# Patient Record
Sex: FEMALE | Race: WHITE | NOT HISPANIC OR LATINO | Employment: UNEMPLOYED | URBAN - METROPOLITAN AREA
[De-identification: names, ages, dates, MRNs, and addresses within clinical notes are randomized per-mention and may not be internally consistent; named-entity substitution may affect disease eponyms.]

---

## 2018-05-30 ENCOUNTER — OFFICE VISIT (OUTPATIENT)
Dept: OBSTETRICS AND GYNECOLOGY | Facility: CLINIC | Age: 28
End: 2018-05-30

## 2018-05-30 VITALS
SYSTOLIC BLOOD PRESSURE: 134 MMHG | BODY MASS INDEX: 31.34 KG/M2 | WEIGHT: 195 LBS | DIASTOLIC BLOOD PRESSURE: 80 MMHG | HEIGHT: 66 IN

## 2018-05-30 DIAGNOSIS — Z11.51 SPECIAL SCREENING EXAMINATION FOR HUMAN PAPILLOMAVIRUS (HPV): ICD-10-CM

## 2018-05-30 DIAGNOSIS — G43.829 MENSTRUAL MIGRAINE WITHOUT STATUS MIGRAINOSUS, NOT INTRACTABLE: ICD-10-CM

## 2018-05-30 DIAGNOSIS — Z01.419 ENCOUNTER FOR GYNECOLOGICAL EXAMINATION WITHOUT ABNORMAL FINDING: Primary | ICD-10-CM

## 2018-05-30 DIAGNOSIS — Z01.419 PAP SMEAR, LOW-RISK: ICD-10-CM

## 2018-05-30 LAB
B-HCG UR QL: NEGATIVE
BILIRUB BLD-MCNC: NEGATIVE MG/DL
CLARITY, POC: CLEAR
COLOR UR: YELLOW
GLUCOSE UR STRIP-MCNC: NEGATIVE MG/DL
INTERNAL NEGATIVE CONTROL: NEGATIVE
INTERNAL POSITIVE CONTROL: POSITIVE
KETONES UR QL: NEGATIVE
LEUKOCYTE EST, POC: NEGATIVE
Lab: NORMAL
NITRITE UR-MCNC: NEGATIVE MG/ML
PH UR: 5 [PH] (ref 5–8)
PROT UR STRIP-MCNC: NEGATIVE MG/DL
RBC # UR STRIP: NEGATIVE /UL
SP GR UR: 1 (ref 1–1.03)
UROBILINOGEN UR QL: NORMAL

## 2018-05-30 PROCEDURE — 99213 OFFICE O/P EST LOW 20 MIN: CPT | Performed by: OBSTETRICS & GYNECOLOGY

## 2018-05-30 PROCEDURE — 99385 PREV VISIT NEW AGE 18-39: CPT | Performed by: OBSTETRICS & GYNECOLOGY

## 2018-05-30 PROCEDURE — 81002 URINALYSIS NONAUTO W/O SCOPE: CPT | Performed by: OBSTETRICS & GYNECOLOGY

## 2018-05-30 PROCEDURE — 81025 URINE PREGNANCY TEST: CPT | Performed by: OBSTETRICS & GYNECOLOGY

## 2018-05-30 RX ORDER — SUMATRIPTAN 100 MG/1
TABLET, FILM COATED ORAL
COMMUNITY
Start: 2018-05-14 | End: 2018-06-12 | Stop reason: SDUPTHER

## 2018-05-30 RX ORDER — NORETHINDRONE ACETATE AND ETHINYL ESTRADIOL AND FERROUS FUMARATE 1MG-20(21)
KIT ORAL
COMMUNITY
Start: 2018-05-14 | End: 2018-08-09

## 2018-05-30 NOTE — PROGRESS NOTES
GYN Annual Exam     CC- Here for annual exam.     Aziza Sarkar is a 28 y.o. female who presents for annual well woman exam. Periods are regular every 28-30 days, lasting 5 days. Dysmenorrhea:none. Cyclic symptoms include none. No intermenstrual bleeding, spotting, or discharge.  Patient is new to the area.  She was on Lo Loestrin to control her menstrual migraines.  Her migraines occurred right before her periods.  She had good control on Lo Loestrin when it became unavailable on her insurance.  She was switched to Microgestin at that time.  Since that time her menstrual migraines have worsened.  She wants to go back on the other medications if possible.    OB History     No data available          Current contraception: OCP (estrogen/progesterone)  History of abnormal Pap smear: no  Family history of uterine, colon or ovarian cancer: no  History of abnormal mammogram: no  Family history of breast cancer: no  Last Pap : 2016    Past Medical History:   Diagnosis Date   • Migraine        Past Surgical History:   Procedure Laterality Date   •  SECTION     • WISDOM TOOTH EXTRACTION           Current Outpatient Prescriptions:   •  MICROGESTIN FE  1-20 MG-MCG per tablet, , Disp: , Rfl:   •  SUMAtriptan (IMITREX) 100 MG tablet, , Disp: , Rfl:     No Known Allergies    Social History   Substance Use Topics   • Smoking status: Never Smoker   • Smokeless tobacco: Not on file   • Alcohol use No       History reviewed. No pertinent family history.    Review of Systems   Constitutional: Negative for appetite change, fever and unexpected weight change.   HENT: Negative for congestion and sore throat.    Eyes: Positive for photophobia.   Respiratory: Negative for cough and shortness of breath.    Cardiovascular: Negative for chest pain and palpitations.   Gastrointestinal: Negative for abdominal distention, abdominal pain, constipation, diarrhea, nausea and vomiting.   Endocrine: Negative.    Genitourinary: Negative  "for dyspareunia, menstrual problem, pelvic pain and vaginal discharge.   Skin: Negative.    Neurological: Negative for dizziness, seizures and syncope.   Hematological: Negative.    Psychiatric/Behavioral: Negative for dysphoric mood and sleep disturbance. The patient is not nervous/anxious.        /80   Ht 167.6 cm (66\")   Wt 88.5 kg (195 lb)   LMP 05/24/2018   Breastfeeding? No   BMI 31.47 kg/m²     Physical Exam   Constitutional: She is oriented to person, place, and time. She appears well-developed and well-nourished.   HENT:   Head: Normocephalic and atraumatic.   Neck: Normal range of motion. Neck supple. No thyromegaly present.   Cardiovascular: Normal rate and regular rhythm.    Pulmonary/Chest: Effort normal and breath sounds normal. Right breast exhibits no mass and no nipple discharge. Left breast exhibits no mass and no nipple discharge. Breasts are symmetrical. There is no breast swelling.   Abdominal: Soft. Bowel sounds are normal. She exhibits no distension and no mass. There is no tenderness. There is no rebound and no guarding.   Genitourinary: Vagina normal and uterus normal. No breast tenderness, discharge or bleeding. Pelvic exam was performed with patient prone. There is no lesion on the right labia. There is no lesion on the left labia. Cervix exhibits no motion tenderness and no discharge. Right adnexum displays no mass. Left adnexum displays no mass.   Musculoskeletal: Normal range of motion. She exhibits no edema.   Neurological: She is alert and oriented to person, place, and time.   Skin: Skin is warm and dry.   Psychiatric: She has a normal mood and affect. Her behavior is normal. Judgment and thought content normal.   Nursing note and vitals reviewed.      Diagnoses and all orders for this visit:    Special screening examination for human papillomavirus (HPV)  -     POC Urinalysis Dipstick  -     POC Pregnancy, Urine  -     Pap IG, Rfx HPV ASCU    Pap smear, low-risk  -     " POC Urinalysis Dipstick  -     POC Pregnancy, Urine  -     Pap IG, Rfx HPV ASCU    Other orders  -     MICROGESTIN FE 1/20 1-20 MG-MCG per tablet;   -     SUMAtriptan (IMITREX) 100 MG tablet;         Assessment     1) GYN annual well woman exam.   2) menstrual migraine-patient will go ahead and stop Microgestin today.  3 samples of Lo Loestrin were given to the patient.  If in fact she feels better then we have evidence for approval for prior authorization through [employer].  3 samples were given.  Follow-up in 3 months.     Plan     1) Breast Health - Clinical breast exam & mammogram yearly, Self breast awareness monthly  2) Pap - done today  3) Smoking status- Never smoker  4) Colon health - screening colonoscopy recommended if not up to date  5) Bone health - Weight bearing exercise, dietary calcium recommendations and vitamin D reviewed.   6) Seat belts recommended  7) Follow up prn and one year    Encounter Diagnoses   Name Primary?   • Special screening examination for human papillomavirus (HPV) Yes   • Pap smear, low-risk          Emmett Travis MD  5/30/2018  9:17 AM

## 2018-06-05 LAB
CONV .: ABNORMAL
CYTOLOGIST CVX/VAG CYTO: ABNORMAL
CYTOLOGY CVX/VAG DOC THIN PREP: ABNORMAL
DX ICD CODE: ABNORMAL
DX ICD CODE: ABNORMAL
HIV 1 & 2 AB SER-IMP: ABNORMAL
HPV I/H RISK 1 DNA CVX QL PROBE+SIG AMP: NORMAL
HPV I/H RISK 4 DNA CVX QL PROBE+SIG AMP: NEGATIVE
OTHER STN SPEC: ABNORMAL
PATH REPORT.FINAL DX SPEC: ABNORMAL
PATHOLOGIST CVX/VAG CYTO: ABNORMAL
STAT OF ADQ CVX/VAG CYTO-IMP: ABNORMAL

## 2018-06-06 ENCOUNTER — TELEPHONE (OUTPATIENT)
Dept: OBSTETRICS AND GYNECOLOGY | Facility: CLINIC | Age: 28
End: 2018-06-06

## 2018-06-06 NOTE — TELEPHONE ENCOUNTER
Would you be willing to refill pts sumatriptin? She says she asked you about it when she saw you back on 05/30/18.

## 2018-06-12 RX ORDER — SUMATRIPTAN 100 MG/1
TABLET, FILM COATED ORAL
Qty: 20 TABLET | Refills: 2 | Status: SHIPPED | OUTPATIENT
Start: 2018-06-12 | End: 2019-11-18 | Stop reason: SDUPTHER

## 2018-08-09 ENCOUNTER — OFFICE VISIT (OUTPATIENT)
Dept: OBSTETRICS AND GYNECOLOGY | Facility: CLINIC | Age: 28
End: 2018-08-09

## 2018-08-09 VITALS
SYSTOLIC BLOOD PRESSURE: 122 MMHG | WEIGHT: 200 LBS | HEIGHT: 66 IN | BODY MASS INDEX: 32.14 KG/M2 | DIASTOLIC BLOOD PRESSURE: 80 MMHG

## 2018-08-09 DIAGNOSIS — Z13.9 SCREENING FOR CONDITION: ICD-10-CM

## 2018-08-09 DIAGNOSIS — G43.829 MENSTRUAL MIGRAINE WITHOUT STATUS MIGRAINOSUS, NOT INTRACTABLE: Primary | ICD-10-CM

## 2018-08-09 PROCEDURE — 81002 URINALYSIS NONAUTO W/O SCOPE: CPT | Performed by: OBSTETRICS & GYNECOLOGY

## 2018-08-09 PROCEDURE — 99213 OFFICE O/P EST LOW 20 MIN: CPT | Performed by: OBSTETRICS & GYNECOLOGY

## 2018-08-09 PROCEDURE — 81025 URINE PREGNANCY TEST: CPT | Performed by: OBSTETRICS & GYNECOLOGY

## 2018-08-09 NOTE — PROGRESS NOTES
"      Aziza Sarkar is a 28 y.o. patient who presents for follow up of   Chief Complaint   Patient presents with   • Follow-up     migraines       HPI 28-year-old female who is here to follow-up for menstrual migraines.  She had done well on Lo Loestrin in the past but her insurance stopped covering it.  She was switched to Microgestin migraines returned.  3 months of samples were given to the patient.  She reports very minimal periods on the 3 months of samples that she had and she had no menstrual migraines.  She still did have some migraines but they were not associated with her periods.  She is happy to be back on Lo Loestrin because it works better for her.    The following portions of the patient's history were reviewed and updated as appropriate: allergies, current medications and problem list.    Review of Systems   Constitutional: Negative for appetite change, fever and unexpected weight change.   Respiratory: Negative for cough and shortness of breath.    Cardiovascular: Negative for chest pain and palpitations.   Gastrointestinal: Negative for abdominal distention, abdominal pain, constipation, diarrhea, nausea and vomiting.   Endocrine: Negative.    Genitourinary: Negative for dyspareunia, menstrual problem, pelvic pain and vaginal discharge.   Skin: Negative.    Hematological: Negative.    Psychiatric/Behavioral: Negative for dysphoric mood and sleep disturbance. The patient is not nervous/anxious.        /80   Ht 167.6 cm (66\")   Wt 90.7 kg (200 lb)   BMI 32.28 kg/m²     Physical Exam   Constitutional: She is oriented to person, place, and time. She appears well-developed and well-nourished.   HENT:   Head: Normocephalic and atraumatic.   Pulmonary/Chest: Effort normal.   Abdominal: Soft. She exhibits no distension and no mass. There is no tenderness. There is no rebound and no guarding.   Musculoskeletal: Normal range of motion.   Neurological: She is alert and oriented to person, place, and " time.   Skin: Skin is warm and dry.   Psychiatric: She has a normal mood and affect. Her behavior is normal. Judgment and thought content normal.   Nursing note and vitals reviewed.        Assessment/Plan    Aziza was seen today for follow-up.    Diagnoses and all orders for this visit:    Menstrual migraine without status migrainosus, not intractable    Screening for condition  -     POC Urinalysis Dipstick  -     POC Pregnancy, Urine    Other orders  -     Norethin-Eth Estrad-Fe Biphas (LO LOESTRIN FE) 1 MG-10 MCG / 10 MCG tablet; Take 1 tablet by mouth Daily.    2 more samples were given.  Need to pre-certify Loestrin for patient.    Return in about 1 year (around 8/9/2019) for Annual physical.      Emmett Travis MD  8/9/2018  9:18 AM

## 2018-08-23 ENCOUNTER — TELEPHONE (OUTPATIENT)
Dept: OBSTETRICS AND GYNECOLOGY | Facility: CLINIC | Age: 28
End: 2018-08-23

## 2018-08-23 NOTE — TELEPHONE ENCOUNTER
Per Herber at [facility], patient has no active drug Rx coverage. I called the patient and left this information on her voice mail

## 2019-06-13 ENCOUNTER — OFFICE VISIT (OUTPATIENT)
Dept: OBSTETRICS AND GYNECOLOGY | Facility: CLINIC | Age: 29
End: 2019-06-13

## 2019-06-13 VITALS
BODY MASS INDEX: 30.63 KG/M2 | SYSTOLIC BLOOD PRESSURE: 122 MMHG | HEIGHT: 66 IN | WEIGHT: 190.6 LBS | DIASTOLIC BLOOD PRESSURE: 80 MMHG

## 2019-06-13 DIAGNOSIS — Z01.419 WELL WOMAN EXAM WITH ROUTINE GYNECOLOGICAL EXAM: Primary | ICD-10-CM

## 2019-06-13 DIAGNOSIS — N91.2 AMENORRHEA: ICD-10-CM

## 2019-06-13 PROCEDURE — 81002 URINALYSIS NONAUTO W/O SCOPE: CPT | Performed by: OBSTETRICS & GYNECOLOGY

## 2019-06-13 PROCEDURE — 81025 URINE PREGNANCY TEST: CPT | Performed by: OBSTETRICS & GYNECOLOGY

## 2019-06-13 PROCEDURE — 99395 PREV VISIT EST AGE 18-39: CPT | Performed by: OBSTETRICS & GYNECOLOGY

## 2019-06-13 RX ORDER — SUMATRIPTAN 100 MG/1
TABLET, FILM COATED ORAL
COMMUNITY
Start: 2019-01-16 | End: 2019-06-13 | Stop reason: SDUPTHER

## 2019-06-13 NOTE — PROGRESS NOTES
GYN Annual Exam     CC- Here for annual exam.     Aziza Sarkar is a 29 y.o. female who presents for annual well woman exam. Periods are absent.  Patient had been using low Loestrin to help with migraines and for her cycling.  Patient has secondary amenorrhea.  She only conceived with her twins after 9 months of Clomid therapy.  She was subsequently placed back on low Loestrin to help her cycle.  She ran out in January of this year and has had no bleeding since.    OB History      Para Term  AB Living    1 1   1   2    SAB TAB Ectopic Molar Multiple Live Births            1 2          Current contraception: none  History of abnormal Pap smear: yes - ASCUS, neg HPV  Family history of uterine, colon or ovarian cancer: no  History of abnormal mammogram: no  Family history of breast cancer: no  Last Pap : 2018    Past Medical History:   Diagnosis Date   • Migraine        Past Surgical History:   Procedure Laterality Date   •  SECTION     • WISDOM TOOTH EXTRACTION           Current Outpatient Medications:   •  SUMAtriptan (IMITREX) 100 MG tablet, Take 1 po, many repeat in 2 hrs x 1, Disp: 20 tablet, Rfl: 2    No Known Allergies    Social History     Tobacco Use   • Smoking status: Never Smoker   • Smokeless tobacco: Never Used   Substance Use Topics   • Alcohol use: No   • Drug use: No       Family History   Problem Relation Age of Onset   • No Known Problems Father    • Ulcerative colitis Mother    • No Known Problems Sister    • No Known Problems Daughter    • Heart disease Paternal Grandfather    • Heart disease Paternal Grandmother    • Heart disease Maternal Grandmother    • Heart disease Maternal Grandfather    • No Known Problems Daughter        Review of Systems   Constitutional: Negative for appetite change, fever and unexpected weight change.   HENT: Negative for congestion and sore throat.    Respiratory: Negative for cough and shortness of breath.    Cardiovascular: Negative for chest  "pain and palpitations.   Gastrointestinal: Negative for abdominal distention, abdominal pain, constipation, diarrhea, nausea and vomiting.   Endocrine: Negative.    Genitourinary: Negative for dyspareunia, menstrual problem, pelvic pain and vaginal discharge.   Skin: Negative.    Neurological: Negative for dizziness and syncope.   Hematological: Negative.    Psychiatric/Behavioral: Negative for dysphoric mood and sleep disturbance. The patient is not nervous/anxious.        /80   Ht 167.6 cm (66\")   Wt 86.5 kg (190 lb 9.6 oz)   LMP 02/01/2019 (Approximate) Comment: very small  BMI 30.76 kg/m²     Physical Exam   Constitutional: She is oriented to person, place, and time. She appears well-developed and well-nourished.   HENT:   Head: Normocephalic and atraumatic.   Neck: Normal range of motion. Neck supple. No thyromegaly present.   Cardiovascular: Normal rate and regular rhythm.   Pulmonary/Chest: Effort normal and breath sounds normal. Right breast exhibits no mass and no nipple discharge. Left breast exhibits no mass and no nipple discharge. Breasts are symmetrical. There is no breast swelling.   Abdominal: Soft. Bowel sounds are normal. She exhibits no distension and no mass. There is no tenderness. There is no rebound and no guarding.   Genitourinary: Vagina normal and uterus normal. No breast tenderness, discharge or bleeding. Pelvic exam was performed with patient prone. There is no lesion on the right labia. There is no lesion on the left labia. Cervix exhibits no motion tenderness and no discharge. Right adnexum displays no mass. Left adnexum displays no mass.   Musculoskeletal: Normal range of motion. She exhibits no edema.   Neurological: She is alert and oriented to person, place, and time.   Skin: Skin is warm and dry.   Psychiatric: She has a normal mood and affect. Her behavior is normal. Judgment and thought content normal.   Nursing note and vitals reviewed.      Diagnoses and all orders " for this visit:    Well woman exam with routine gynecological exam  -     POC Urinalysis Dipstick  -     POC Pregnancy, Urine  -     Pap IG, HPV-hr    Other orders  -     Discontinue: SUMAtriptan (IMITREX) 100 MG tablet        Assessment     1) GYN annual well woman exam.   2) secondary amenorrhea-referral to reproductive endocrine for evaluation.  Patient will need to cycle at some point.  Need etiology.     Plan     1) Breast Health - Clinical breast exam & mammogram yearly, Self breast awareness monthly  2) Pap - done today  3) Smoking status- Never smoker  4) Colon health - screening colonoscopy recommended if not up to date  5) Bone health - Weight bearing exercise, dietary calcium recommendations and vitamin D reviewed.   6) Seat belts recommended  7) Follow up prn and one year    Encounter Diagnoses   Name Primary?   • Well woman exam with routine gynecological exam Yes         Emmett Travis MD  6/13/2019  4:01 PM

## 2019-06-18 LAB
CYTOLOGIST CVX/VAG CYTO: NORMAL
CYTOLOGY CVX/VAG DOC CYTO: NORMAL
CYTOLOGY CVX/VAG DOC THIN PREP: NORMAL
DX ICD CODE: NORMAL
HIV 1 & 2 AB SER-IMP: NORMAL
HPV I/H RISK 1 DNA CVX QL PROBE+SIG AMP: NEGATIVE
OTHER STN SPEC: NORMAL
STAT OF ADQ CVX/VAG CYTO-IMP: NORMAL

## 2019-08-20 ENCOUNTER — OFFICE VISIT (OUTPATIENT)
Dept: OBSTETRICS AND GYNECOLOGY | Facility: CLINIC | Age: 29
End: 2019-08-20

## 2019-08-20 ENCOUNTER — PROCEDURE VISIT (OUTPATIENT)
Dept: OBSTETRICS AND GYNECOLOGY | Facility: CLINIC | Age: 29
End: 2019-08-20

## 2019-08-20 VITALS
BODY MASS INDEX: 31.31 KG/M2 | SYSTOLIC BLOOD PRESSURE: 142 MMHG | WEIGHT: 194.8 LBS | DIASTOLIC BLOOD PRESSURE: 104 MMHG | HEIGHT: 66 IN

## 2019-08-20 DIAGNOSIS — Z32.00 POSSIBLE PREGNANCY, NOT CONFIRMED: Primary | ICD-10-CM

## 2019-08-20 DIAGNOSIS — O36.80X0 ENCOUNTER TO DETERMINE FETAL VIABILITY OF PREGNANCY, SINGLE OR UNSPECIFIED FETUS: Primary | ICD-10-CM

## 2019-08-20 DIAGNOSIS — Z98.891 HISTORY OF CESAREAN DELIVERY: ICD-10-CM

## 2019-08-20 LAB
B-HCG UR QL: POSITIVE
BILIRUB BLD-MCNC: NEGATIVE MG/DL
CLARITY, POC: CLEAR
COLOR UR: YELLOW
GLUCOSE UR STRIP-MCNC: NEGATIVE MG/DL
INTERNAL NEGATIVE CONTROL: POSITIVE
INTERNAL POSITIVE CONTROL: POSITIVE
KETONES UR QL: NEGATIVE
LEUKOCYTE EST, POC: NEGATIVE
Lab: ABNORMAL
NITRITE UR-MCNC: NEGATIVE MG/ML
PH UR: 5.5 [PH] (ref 5–8)
PROT UR STRIP-MCNC: NEGATIVE MG/DL
RBC # UR STRIP: NEGATIVE /UL
SP GR UR: 1.01 (ref 1–1.03)
UROBILINOGEN UR QL: NORMAL

## 2019-08-20 PROCEDURE — 81002 URINALYSIS NONAUTO W/O SCOPE: CPT | Performed by: OBSTETRICS & GYNECOLOGY

## 2019-08-20 PROCEDURE — 81025 URINE PREGNANCY TEST: CPT | Performed by: OBSTETRICS & GYNECOLOGY

## 2019-08-20 PROCEDURE — 76817 TRANSVAGINAL US OBSTETRIC: CPT | Performed by: OBSTETRICS & GYNECOLOGY

## 2019-08-20 PROCEDURE — 99213 OFFICE O/P EST LOW 20 MIN: CPT | Performed by: OBSTETRICS & GYNECOLOGY

## 2019-08-20 NOTE — PROGRESS NOTES
"PROBLEM VISIT    Chief Complaint: missed menses      Aziza Sarkar is a 29 y.o. patient who presents with missed menses and +UPT. Pt reports that she stopped OCPs at the end of 2018. She has had no bleeding since she stopped. She required Clomid for her last pregnancy that resulted in twins. Pt was seen for her annual in 2019 and was not pregnant. Pt took a pregnancy test bc her nipple were sensitive.  Chief Complaint   Patient presents with   • Possible Pregnancy             The following portions of the patient's history were reviewed and updated as appropriate: allergies, current medications and problem list.    Review of Systems   Constitutional: Positive for fatigue.   Gastrointestinal: Negative for nausea and vomiting.   Genitourinary: Positive for menstrual problem. Negative for pelvic pain, vaginal bleeding, vaginal discharge and vaginal pain.       BP (!) 142/104   Ht 167.6 cm (66\")   Wt 88.4 kg (194 lb 12.8 oz)   LMP  (LMP Unknown)   BMI 31.44 kg/m²     Physical Exam   Constitutional: She is oriented to person, place, and time. She appears well-developed and well-nourished. No distress.   Abdominal: Soft. She exhibits no distension. There is no tenderness. There is no rigidity and no guarding.   Neurological: She is alert and oriented to person, place, and time. No cranial nerve deficit. Coordination normal.   Skin: She is not diaphoretic.   Psychiatric: She has a normal mood and affect. Her behavior is normal. Judgment and thought content normal.   Vitals reviewed.        Assessment/Plan   Aziza was seen today for possible pregnancy.    Diagnoses and all orders for this visit:    Possible pregnancy, not confirmed  -     POC Urinalysis Dipstick  -     POC Pregnancy, Urine  -     HCG, B-subunit, Quantitative    History of  delivery    30yo with missed menses and unknown LMP    1) missed menses: She has not had a menstrual cycle since she stopped birth control in 2018.  " Positive urine pregnancy test.  Transvaginal ultrasound performed today reveals a gestational sac and yolk sac.  No IUP.  No adnexal masses.  Check beta-hCG quant level.  Follow-up in 2 weeks for another ultrasound and new OB visit. On vitamins    2) Hx of infertility: Had twins from Clomid induction. This is a spontaneous pregnancy.    3) Hx of prior C section               Return in about 2 weeks (around 9/3/2019).      Lore Koo DO    8/20/2019  3:25 PM

## 2019-08-21 LAB — HCG INTACT+B SERPL-ACNC: NORMAL MIU/ML

## 2019-08-30 ENCOUNTER — TELEPHONE (OUTPATIENT)
Dept: OBSTETRICS AND GYNECOLOGY | Facility: CLINIC | Age: 29
End: 2019-08-30

## 2019-09-04 RX ORDER — DIPHENHYDRAMINE HYDROCHLORIDE 25 MG/1
25 CAPSULE ORAL NIGHTLY
Qty: 30 TABLET | Refills: 0 | Status: SHIPPED | OUTPATIENT
Start: 2019-09-04 | End: 2019-10-07 | Stop reason: HOSPADM

## 2019-09-05 ENCOUNTER — INITIAL PRENATAL (OUTPATIENT)
Dept: OBSTETRICS AND GYNECOLOGY | Facility: CLINIC | Age: 29
End: 2019-09-05

## 2019-09-05 ENCOUNTER — PROCEDURE VISIT (OUTPATIENT)
Dept: OBSTETRICS AND GYNECOLOGY | Facility: CLINIC | Age: 29
End: 2019-09-05

## 2019-09-05 VITALS — SYSTOLIC BLOOD PRESSURE: 112 MMHG | BODY MASS INDEX: 31.64 KG/M2 | WEIGHT: 196 LBS | DIASTOLIC BLOOD PRESSURE: 82 MMHG

## 2019-09-05 DIAGNOSIS — Z36.9 ENCOUNTER FOR ANTENATAL SCREENING, UNSPECIFIED: ICD-10-CM

## 2019-09-05 DIAGNOSIS — Z34.91 NORMAL PREGNANCY IN FIRST TRIMESTER: Primary | ICD-10-CM

## 2019-09-05 DIAGNOSIS — O21.9 NAUSEA AND VOMITING DURING PREGNANCY: ICD-10-CM

## 2019-09-05 DIAGNOSIS — O36.80X0 ENCOUNTER TO DETERMINE FETAL VIABILITY OF PREGNANCY, SINGLE OR UNSPECIFIED FETUS: Primary | ICD-10-CM

## 2019-09-05 PROCEDURE — 0501F PRENATAL FLOW SHEET: CPT | Performed by: OBSTETRICS & GYNECOLOGY

## 2019-09-05 PROCEDURE — 76801 OB US < 14 WKS SINGLE FETUS: CPT | Performed by: OBSTETRICS & GYNECOLOGY

## 2019-09-05 NOTE — PROGRESS NOTES
OB follow up     Aziza Sarkar is a 29 y.o.  7w6d being seen today for her obstetrical visit.  Patient reports no bleeding and no leaking. Fetal movement: absent, patient reports significant nausea and vomiting but good relief with bonejesta. Needs refill.     Review of Systems  No bleeding, No cramping/contractions     /82   Wt 88.9 kg (196 lb)   LMP  (LMP Unknown)   BMI 31.64 kg/m²     FHT:   BPM   Uterine Size:     Ultrasound shows 7-week 6-day cade gestation with cardiac activity.  Both ovaries are normal adnexa is clear.    Assessment/Plan:    1) 29 y.o.  -pregnancy at 7w6d    2)   Encounter Diagnoses   Name Primary?   • Normal pregnancy in first trimester Yes   • Encounter for  screening, unspecified    • Nausea and vomiting during pregnancy    More samples of medications given.  EDC set up.  Draw prenatal labs today.    3) Reviewed this stage of pregnancy  4) Problem list updated     Return in about 4 weeks (around 10/3/2019).      Emmett Travis MD    2019  4:20 PM

## 2019-09-06 LAB
ABO GROUP BLD: (no result)
BASOPHILS # BLD AUTO: 0 X10E3/UL (ref 0–0.2)
BASOPHILS NFR BLD AUTO: 0 %
BLD GP AB SCN SERPL QL: NEGATIVE
EOSINOPHIL # BLD AUTO: 0.1 X10E3/UL (ref 0–0.4)
EOSINOPHIL NFR BLD AUTO: 1 %
ERYTHROCYTE [DISTWIDTH] IN BLOOD BY AUTOMATED COUNT: 12.6 % (ref 12.3–15.4)
HBA1C MFR BLD: 5.1 % (ref 4.8–5.6)
HBV SURFACE AG SERPL QL IA: NEGATIVE
HCT VFR BLD AUTO: 36.7 % (ref 34–46.6)
HCV AB S/CO SERPL IA: <0.1 S/CO RATIO (ref 0–0.9)
HGB BLD-MCNC: 12.6 G/DL (ref 11.1–15.9)
HIV 1+2 AB+HIV1 P24 AG SERPL QL IA: NON REACTIVE
IMM GRANULOCYTES # BLD AUTO: 0 X10E3/UL (ref 0–0.1)
IMM GRANULOCYTES NFR BLD AUTO: 0 %
LYMPHOCYTES # BLD AUTO: 3 X10E3/UL (ref 0.7–3.1)
LYMPHOCYTES NFR BLD AUTO: 33 %
MCH RBC QN AUTO: 31.3 PG (ref 26.6–33)
MCHC RBC AUTO-ENTMCNC: 34.3 G/DL (ref 31.5–35.7)
MCV RBC AUTO: 91 FL (ref 79–97)
MONOCYTES # BLD AUTO: 0.7 X10E3/UL (ref 0.1–0.9)
MONOCYTES NFR BLD AUTO: 8 %
NEUTROPHILS # BLD AUTO: 5.3 X10E3/UL (ref 1.4–7)
NEUTROPHILS NFR BLD AUTO: 58 %
PLATELET # BLD AUTO: 339 X10E3/UL (ref 150–450)
RBC # BLD AUTO: 4.03 X10E6/UL (ref 3.77–5.28)
RH BLD: POSITIVE
RPR SER QL: NON REACTIVE
RUBV IGG SERPL IA-ACNC: 7.02 INDEX
WBC # BLD AUTO: 9 X10E3/UL (ref 3.4–10.8)

## 2019-09-10 ENCOUNTER — TELEPHONE (OUTPATIENT)
Dept: OBSTETRICS AND GYNECOLOGY | Facility: CLINIC | Age: 29
End: 2019-09-10

## 2019-09-11 LAB
BACTERIA UR CULT: ABNORMAL
BACTERIA UR CULT: ABNORMAL
C TRACH RRNA VAG QL NAA+PROBE: NEGATIVE
DRUGS UR: NORMAL
N GONORRHOEA RRNA VAG QL NAA+PROBE: NEGATIVE
OTHER ANTIBIOTIC SUSC ISLT: ABNORMAL
T VAGINALIS RRNA VAG QL NAA+PROBE: NEGATIVE

## 2019-09-12 RX ORDER — NITROFURANTOIN 25; 75 MG/1; MG/1
100 CAPSULE ORAL 2 TIMES DAILY
Qty: 14 CAPSULE | Refills: 0 | Status: SHIPPED | OUTPATIENT
Start: 2019-09-12 | End: 2019-09-19

## 2019-10-04 ENCOUNTER — PROCEDURE VISIT (OUTPATIENT)
Dept: OBSTETRICS AND GYNECOLOGY | Facility: CLINIC | Age: 29
End: 2019-10-04

## 2019-10-04 ENCOUNTER — PREP FOR SURGERY (OUTPATIENT)
Dept: OTHER | Facility: HOSPITAL | Age: 29
End: 2019-10-04

## 2019-10-04 ENCOUNTER — ROUTINE PRENATAL (OUTPATIENT)
Dept: OBSTETRICS AND GYNECOLOGY | Facility: CLINIC | Age: 29
End: 2019-10-04

## 2019-10-04 DIAGNOSIS — O20.0 THREATENED ABORTION: Primary | ICD-10-CM

## 2019-10-04 DIAGNOSIS — O03.9 SAB (SPONTANEOUS ABORTION): Primary | ICD-10-CM

## 2019-10-04 PROCEDURE — 99214 OFFICE O/P EST MOD 30 MIN: CPT | Performed by: OBSTETRICS & GYNECOLOGY

## 2019-10-04 PROCEDURE — 76817 TRANSVAGINAL US OBSTETRIC: CPT | Performed by: OBSTETRICS & GYNECOLOGY

## 2019-10-04 RX ORDER — SODIUM CHLORIDE 0.9 % (FLUSH) 0.9 %
3 SYRINGE (ML) INJECTION EVERY 12 HOURS SCHEDULED
Status: CANCELLED | OUTPATIENT
Start: 2019-10-04

## 2019-10-04 RX ORDER — SODIUM CHLORIDE 9 MG/ML
40 INJECTION, SOLUTION INTRAVENOUS AS NEEDED
Status: CANCELLED | OUTPATIENT
Start: 2019-10-04

## 2019-10-04 RX ORDER — SODIUM CHLORIDE 0.9 % (FLUSH) 0.9 %
1-10 SYRINGE (ML) INJECTION AS NEEDED
Status: CANCELLED | OUTPATIENT
Start: 2019-10-04

## 2019-10-04 NOTE — PROGRESS NOTES
PROBLEM VISIT    Chief Complaint: VB      Aziza Sarkar is a 29 y.o. patient  with hx of 33 week twin gestation presents to office today at 12 weeks with VB. VB started last night. She had some spotting a couple of weeks ago. Bleeding was more last night so she presented for US and no FCA activity seen on US today.  Chief Complaint   Patient presents with   • Pregnancy Problem             The following portions of the patient's history were reviewed and updated as appropriate: allergies, current medications and problem list.    Review of Systems  +VB. +cramping. No F/C/  No Nausea/vomiting    Physical Exam    Constitutional: She is oriented to person, place, and time. She appears well-developed and well-nourished.   Neurological: She is alert and oriented to person, place, and time.   Psychiatric: She has a normal mood and affect. She is teary eyed during interview. Her behavior is normal. Judgment and thought content normal.   Vitals reviewed.    Assessment/Plan   Aziza was seen today for pregnancy problem.    Diagnoses and all orders for this visit:    SAB (spontaneous )    29-year-old -1-0-2 with SAB    Transvaginal ultrasound reveals IUP at 7 weeks 5 days with no fetal cardiac activity.  Rh+  Patient was given options for management of SAB including doing nothing and waiting to see if her body would pass the contents of the uterus on its own, Cytotec induction or suction D&C.  Patient has decided to proceed with a suction D&C.  We will proceed with scheduling surgery.  The procedure was reviewed at length with the patient including pre-/intra-/post procedure.  Patient was given precautions to call the office with heavy vaginal bleeding.               No Follow-up on file.      Lore Koo DO    10/8/2019  12:45 AM

## 2019-10-07 ENCOUNTER — HOSPITAL ENCOUNTER (OUTPATIENT)
Facility: HOSPITAL | Age: 29
Setting detail: HOSPITAL OUTPATIENT SURGERY
Discharge: HOME | End: 2019-10-07
Attending: OBSTETRICS & GYNECOLOGY | Admitting: OBSTETRICS & GYNECOLOGY

## 2019-10-07 ENCOUNTER — ANESTHESIA EVENT (OUTPATIENT)
Dept: PERIOP | Facility: HOSPITAL | Age: 29
End: 2019-10-07

## 2019-10-07 ENCOUNTER — ANESTHESIA (OUTPATIENT)
Dept: PERIOP | Facility: HOSPITAL | Age: 29
End: 2019-10-07

## 2019-10-07 VITALS
WEIGHT: 196.6 LBS | HEART RATE: 68 BPM | DIASTOLIC BLOOD PRESSURE: 92 MMHG | OXYGEN SATURATION: 99 % | HEIGHT: 66 IN | TEMPERATURE: 97.2 F | BODY MASS INDEX: 31.6 KG/M2 | RESPIRATION RATE: 16 BRPM | SYSTOLIC BLOOD PRESSURE: 124 MMHG

## 2019-10-07 DIAGNOSIS — O03.9 SAB (SPONTANEOUS ABORTION): ICD-10-CM

## 2019-10-07 PROCEDURE — 25010000002 KETOROLAC TROMETHAMINE PER 15 MG: Performed by: NURSE ANESTHETIST, CERTIFIED REGISTERED

## 2019-10-07 PROCEDURE — 25010000002 FENTANYL CITRATE (PF) 100 MCG/2ML SOLUTION: Performed by: NURSE ANESTHETIST, CERTIFIED REGISTERED

## 2019-10-07 PROCEDURE — 25010000002 ONDANSETRON PER 1 MG: Performed by: NURSE ANESTHETIST, CERTIFIED REGISTERED

## 2019-10-07 PROCEDURE — 59812 TREATMENT OF MISCARRIAGE: CPT | Performed by: OBSTETRICS & GYNECOLOGY

## 2019-10-07 PROCEDURE — 25010000002 PROPOFOL 10 MG/ML EMULSION: Performed by: NURSE ANESTHETIST, CERTIFIED REGISTERED

## 2019-10-07 PROCEDURE — 25010000002 MIDAZOLAM PER 1 MG: Performed by: NURSE ANESTHETIST, CERTIFIED REGISTERED

## 2019-10-07 PROCEDURE — 25010000002 DEXAMETHASONE PER 1 MG: Performed by: NURSE ANESTHETIST, CERTIFIED REGISTERED

## 2019-10-07 PROCEDURE — 88305 TISSUE EXAM BY PATHOLOGIST: CPT | Performed by: OBSTETRICS & GYNECOLOGY

## 2019-10-07 PROCEDURE — S0260 H&P FOR SURGERY: HCPCS | Performed by: OBSTETRICS & GYNECOLOGY

## 2019-10-07 RX ORDER — SODIUM CHLORIDE 0.9 % (FLUSH) 0.9 %
1-10 SYRINGE (ML) INJECTION AS NEEDED
Status: DISCONTINUED | OUTPATIENT
Start: 2019-10-07 | End: 2019-10-07 | Stop reason: HOSPADM

## 2019-10-07 RX ORDER — SODIUM CHLORIDE 9 MG/ML
40 INJECTION, SOLUTION INTRAVENOUS AS NEEDED
Status: DISCONTINUED | OUTPATIENT
Start: 2019-10-07 | End: 2019-10-07 | Stop reason: HOSPADM

## 2019-10-07 RX ORDER — MIDAZOLAM HYDROCHLORIDE 1 MG/ML
1 INJECTION INTRAMUSCULAR; INTRAVENOUS
Status: DISCONTINUED | OUTPATIENT
Start: 2019-10-07 | End: 2019-10-07 | Stop reason: HOSPADM

## 2019-10-07 RX ORDER — PROPOFOL 10 MG/ML
VIAL (ML) INTRAVENOUS AS NEEDED
Status: DISCONTINUED | OUTPATIENT
Start: 2019-10-07 | End: 2019-10-07 | Stop reason: SURG

## 2019-10-07 RX ORDER — LIDOCAINE HYDROCHLORIDE 10 MG/ML
0.5 INJECTION, SOLUTION EPIDURAL; INFILTRATION; INTRACAUDAL; PERINEURAL ONCE AS NEEDED
Status: DISCONTINUED | OUTPATIENT
Start: 2019-10-07 | End: 2019-10-07 | Stop reason: HOSPADM

## 2019-10-07 RX ORDER — NAPROXEN 500 MG/1
500 TABLET ORAL 2 TIMES DAILY WITH MEALS
Qty: 20 TABLET | Refills: 0 | OUTPATIENT
Start: 2019-10-07 | End: 2021-08-16

## 2019-10-07 RX ORDER — MIDAZOLAM HYDROCHLORIDE 1 MG/ML
2 INJECTION INTRAMUSCULAR; INTRAVENOUS
Status: DISCONTINUED | OUTPATIENT
Start: 2019-10-07 | End: 2019-10-07 | Stop reason: HOSPADM

## 2019-10-07 RX ORDER — KETOROLAC TROMETHAMINE 30 MG/ML
INJECTION, SOLUTION INTRAMUSCULAR; INTRAVENOUS AS NEEDED
Status: DISCONTINUED | OUTPATIENT
Start: 2019-10-07 | End: 2019-10-07 | Stop reason: SURG

## 2019-10-07 RX ORDER — HYDROMORPHONE HYDROCHLORIDE 1 MG/ML
0.5 INJECTION, SOLUTION INTRAMUSCULAR; INTRAVENOUS; SUBCUTANEOUS AS NEEDED
Status: DISCONTINUED | OUTPATIENT
Start: 2019-10-07 | End: 2019-10-07 | Stop reason: HOSPADM

## 2019-10-07 RX ORDER — OXYCODONE HYDROCHLORIDE AND ACETAMINOPHEN 5; 325 MG/1; MG/1
1 TABLET ORAL ONCE AS NEEDED
Status: DISCONTINUED | OUTPATIENT
Start: 2019-10-07 | End: 2019-10-07 | Stop reason: HOSPADM

## 2019-10-07 RX ORDER — DEXAMETHASONE SODIUM PHOSPHATE 4 MG/ML
8 INJECTION, SOLUTION INTRA-ARTICULAR; INTRALESIONAL; INTRAMUSCULAR; INTRAVENOUS; SOFT TISSUE ONCE AS NEEDED
Status: COMPLETED | OUTPATIENT
Start: 2019-10-07 | End: 2019-10-07

## 2019-10-07 RX ORDER — OXYCODONE AND ACETAMINOPHEN 7.5; 325 MG/1; MG/1
2 TABLET ORAL EVERY 4 HOURS PRN
Status: DISCONTINUED | OUTPATIENT
Start: 2019-10-07 | End: 2019-10-07 | Stop reason: HOSPADM

## 2019-10-07 RX ORDER — FENTANYL CITRATE 50 UG/ML
INJECTION, SOLUTION INTRAMUSCULAR; INTRAVENOUS AS NEEDED
Status: DISCONTINUED | OUTPATIENT
Start: 2019-10-07 | End: 2019-10-07 | Stop reason: SURG

## 2019-10-07 RX ORDER — SODIUM CHLORIDE 0.9 % (FLUSH) 0.9 %
3 SYRINGE (ML) INJECTION EVERY 12 HOURS SCHEDULED
Status: DISCONTINUED | OUTPATIENT
Start: 2019-10-07 | End: 2019-10-07 | Stop reason: HOSPADM

## 2019-10-07 RX ORDER — KETAMINE HYDROCHLORIDE 100 MG/ML
INJECTION INTRAMUSCULAR; INTRAVENOUS AS NEEDED
Status: DISCONTINUED | OUTPATIENT
Start: 2019-10-07 | End: 2019-10-07 | Stop reason: SURG

## 2019-10-07 RX ORDER — SODIUM CHLORIDE, SODIUM LACTATE, POTASSIUM CHLORIDE, CALCIUM CHLORIDE 600; 310; 30; 20 MG/100ML; MG/100ML; MG/100ML; MG/100ML
9 INJECTION, SOLUTION INTRAVENOUS CONTINUOUS
Status: DISCONTINUED | OUTPATIENT
Start: 2019-10-07 | End: 2019-10-07 | Stop reason: HOSPADM

## 2019-10-07 RX ORDER — SODIUM CHLORIDE, SODIUM LACTATE, POTASSIUM CHLORIDE, CALCIUM CHLORIDE 600; 310; 30; 20 MG/100ML; MG/100ML; MG/100ML; MG/100ML
100 INJECTION, SOLUTION INTRAVENOUS CONTINUOUS
Status: DISCONTINUED | OUTPATIENT
Start: 2019-10-07 | End: 2019-10-07 | Stop reason: HOSPADM

## 2019-10-07 RX ORDER — ONDANSETRON 2 MG/ML
4 INJECTION INTRAMUSCULAR; INTRAVENOUS ONCE AS NEEDED
Status: DISCONTINUED | OUTPATIENT
Start: 2019-10-07 | End: 2019-10-07 | Stop reason: HOSPADM

## 2019-10-07 RX ORDER — MAGNESIUM HYDROXIDE 1200 MG/15ML
LIQUID ORAL AS NEEDED
Status: DISCONTINUED | OUTPATIENT
Start: 2019-10-07 | End: 2019-10-07 | Stop reason: HOSPADM

## 2019-10-07 RX ORDER — LIDOCAINE HYDROCHLORIDE 20 MG/ML
INJECTION, SOLUTION INFILTRATION; PERINEURAL AS NEEDED
Status: DISCONTINUED | OUTPATIENT
Start: 2019-10-07 | End: 2019-10-07 | Stop reason: SURG

## 2019-10-07 RX ORDER — ONDANSETRON 2 MG/ML
4 INJECTION INTRAMUSCULAR; INTRAVENOUS ONCE AS NEEDED
Status: COMPLETED | OUTPATIENT
Start: 2019-10-07 | End: 2019-10-07

## 2019-10-07 RX ADMIN — SODIUM CHLORIDE, POTASSIUM CHLORIDE, SODIUM LACTATE AND CALCIUM CHLORIDE: 600; 310; 30; 20 INJECTION, SOLUTION INTRAVENOUS at 07:49

## 2019-10-07 RX ADMIN — ONDANSETRON 4 MG: 2 INJECTION, SOLUTION INTRAMUSCULAR; INTRAVENOUS at 07:44

## 2019-10-07 RX ADMIN — KETOROLAC TROMETHAMINE 30 MG: 30 INJECTION INTRAMUSCULAR; INTRAVENOUS at 08:08

## 2019-10-07 RX ADMIN — FAMOTIDINE 20 MG: 10 INJECTION, SOLUTION INTRAVENOUS at 07:43

## 2019-10-07 RX ADMIN — FENTANYL CITRATE 50 MCG: 50 INJECTION, SOLUTION INTRAMUSCULAR; INTRAVENOUS at 08:07

## 2019-10-07 RX ADMIN — DEXAMETHASONE SODIUM PHOSPHATE 8 MG: 4 INJECTION, SOLUTION INTRAMUSCULAR; INTRAVENOUS at 07:44

## 2019-10-07 RX ADMIN — KETAMINE HYDROCHLORIDE 10 MG: 100 INJECTION INTRAMUSCULAR; INTRAVENOUS at 08:01

## 2019-10-07 RX ADMIN — PROPOFOL 150 MG: 10 INJECTION, EMULSION INTRAVENOUS at 07:54

## 2019-10-07 RX ADMIN — LIDOCAINE HYDROCHLORIDE 100 MG: 20 INJECTION, SOLUTION INFILTRATION; PERINEURAL at 07:54

## 2019-10-07 RX ADMIN — SODIUM CHLORIDE, POTASSIUM CHLORIDE, SODIUM LACTATE AND CALCIUM CHLORIDE 100 ML/HR: 600; 310; 30; 20 INJECTION, SOLUTION INTRAVENOUS at 08:34

## 2019-10-07 RX ADMIN — MIDAZOLAM HYDROCHLORIDE 1 MG: 1 INJECTION, SOLUTION INTRAMUSCULAR; INTRAVENOUS at 07:44

## 2019-10-07 RX ADMIN — FENTANYL CITRATE 50 MCG: 50 INJECTION, SOLUTION INTRAMUSCULAR; INTRAVENOUS at 07:54

## 2019-10-07 NOTE — H&P
Patient Care Team:  Provider, No Known as PCP - General    Chief complaint incomplete SAB       HPI: This is a 29-year-old  2 para 0-1-0-2 who is at 12-3/7 weeks with no fetal cardiac activity.  She started having heavy vaginal bleeding last night.  The pain got more intense this morning she presents today for suction D&C.  The diagnosis was made last Friday were no cardiac activity was seen.  Measurements were at 7-1/2 weeks and she was 12 weeks by dates.  We planned on doing a suction D&C today regardless.    ROS:   Constitutional: Negative for appetite change, fever and unexpected weight change.   Respiratory: Negative for cough and shortness of breath.    Cardiovascular: Negative for chest pain and palpitations.   Gastrointestinal: Negative for abdominal distention,  constipation, diarrhea, nausea and vomiting.   Endocrine: Negative.    Genitourinary: Negative for dyspareunia or vaginal discharge.   Skin: Negative.    Neurological: Negative for dizziness and headaches.   Hematological: Negative.    Psychiatric/Behavioral: Negative for dysphoric mood and sleep disturbance. The patient is not nervous/anxious.        PMH:   Past Medical History:   Diagnosis Date   • Migraine          PSH:   Past Surgical History:   Procedure Laterality Date   •  SECTION     • WISDOM TOOTH EXTRACTION         SoHx:   Social History     Socioeconomic History   • Marital status:      Spouse name: Not on file   • Number of children: Not on file   • Years of education: Not on file   • Highest education level: Not on file   Tobacco Use   • Smoking status: Never Smoker   • Smokeless tobacco: Never Used   Substance and Sexual Activity   • Alcohol use: No   • Drug use: No   • Sexual activity: Yes     Partners: Male       FHx:   Family History   Problem Relation Age of Onset   • No Known Problems Father    • Ulcerative colitis Mother    • No Known Problems Sister    • No Known Problems Daughter    • Heart disease  Paternal Grandfather    • Heart disease Paternal Grandmother    • Heart disease Maternal Grandmother    • Heart disease Maternal Grandfather    • No Known Problems Daughter        PGyn Hx: deferred    POBHx: as above    Allergies: Patient has no known allergies.    Medications:   No current facility-administered medications on file prior to encounter.      Current Outpatient Medications on File Prior to Encounter   Medication Sig Dispense Refill   • doxylamine (UNISOM) 25 MG tablet Take 1 tablet by mouth At Night As Needed for Nausea. 30 tablet 0   • Prenatal Vit-Fe Fumarate-FA (PRENATAL COMPLETE PO) Take  by mouth Daily.     • SUMAtriptan (IMITREX) 100 MG tablet Take 1 po, many repeat in 2 hrs x 1 20 tablet 2   • vitamin B-6 (PYRIDOXINE) 25 MG tablet Take 1 tablet by mouth Every Night. 30 tablet 0             Vital Signs  Temp:  [98.2 °F (36.8 °C)] 98.2 °F (36.8 °C)  Heart Rate:  [72] 72  Resp:  [16] 16  BP: (139)/(105) 139/105    Physical Exam:  Constitutional: She is oriented to person, place, and time. She appears well-developed and well-nourished.   HENT:   Head: Normocephalic and atraumatic.   Cardiovascular: Normal rate and regular rhythm.    Pulmonary/Chest: Effort normal. No respiratory distress.   Abdominal: Soft. Bowel sounds are normal. She exhibits no distension and no mass. There is no tenderness. There is no rebound and no guarding.   Musculoskeletal: Normal range of motion.   Neurological: She is alert and oriented to person, place, and time.   Skin: Skin is warm and dry.   Psychiatric: She has a normal mood and affect. Her behavior is normal. Judgment and thought content normal.   Nursing note and vitals reviewed.  Debilities/Disabilities Identified: None  Emotional Behavior: Appropriate    Labs: A+ blood type.      Assessment/Plan: Incomplete SAB at 12 weeks by dates, no cardiac activity measurement 7-1/2 weeks.    Plan suction D&C.  I discussed the patients findings and my recommendations with  patient and family.     Emmett Travis MD  10/07/19  7:27 AM

## 2019-10-07 NOTE — ANESTHESIA POSTPROCEDURE EVALUATION
Patient: Aziza Sarkar    Procedure Summary     Date:  10/07/19 Room / Location:   [facility] /  [facility]    Anesthesia Start:  749 Anesthesia Stop:  820    Procedure:  DILATATION AND CURETTAGE WITH SUCTION (N/A Vagina) Diagnosis:       SAB (spontaneous )      (SAB (spontaneous ) [O03.9])    Surgeon:  Emmett Travis MD Provider:  Aurelia Montoya CRNA    Anesthesia Type:  general ASA Status:  2          Anesthesia Type: general  Last vitals  BP   124/92 (10/07/19 0910)   Temp   97.2 °F (36.2 °C) (10/07/19 0834)   Pulse   68 (10/07/19 0910)   Resp   16 (10/07/19 0910)     SpO2   99 % (10/07/19 0910)     Post Anesthesia Care and Evaluation    Patient location during evaluation: PHASE II  Patient participation: complete - patient participated  Level of consciousness: awake and alert  Pain score: 2  Pain management: satisfactory to patient  Airway patency: patent  Anesthetic complications: No anesthetic complications  PONV Status: none  Cardiovascular status: acceptable  Respiratory status: acceptable  Hydration status: acceptable

## 2019-10-07 NOTE — ANESTHESIA PREPROCEDURE EVALUATION
Anesthesia Evaluation     Patient summary reviewed and Nursing notes reviewed   NPO Solid Status: > 8 hours  NPO Liquid Status: > 8 hours           Airway   Mallampati: II  TM distance: >3 FB  Neck ROM: full  No difficulty expected  Dental - normal exam     Pulmonary - negative pulmonary ROS    breath sounds clear to auscultation  Cardiovascular - negative cardio ROS  Exercise tolerance: good (4-7 METS)    Rhythm: regular  Rate: normal        Neuro/Psych- negative ROS  GI/Hepatic/Renal/Endo - negative ROS     Musculoskeletal (-) negative ROS    Abdominal    Substance History - negative use     OB/GYN    (+) Pregnant (missed AB),         Other - negative ROS                       Anesthesia Plan    ASA 2     general     intravenous induction   Anesthetic plan, all risks, benefits, and alternatives have been provided, discussed and informed consent has been obtained with: patient.  Use of blood products discussed with patient  Consented to blood products.   Plan discussed with CRNA.

## 2019-10-07 NOTE — OP NOTE
Operative Note    Date of Service:  10/07/19  Time of Service:  8:12 AM    Surgical Staff: Surgeon(s) and Role:     * Emmett Travis MD - Primary   Additional Staff: none   Pre-operative diagnosis(es): Pre-Op Diagnosis Codes:     * SAB (spontaneous ) [O03.9]     Post-operative diagnosis(es): Post-Op Diagnosis Codes:     * SAB (spontaneous ) [O03.9]   Procedure(s): Procedure(s):  DILATATION AND CURETTAGE WITH SUCTION     Antibiotics: None ordered on call to OR     Anesthesia: Type: General  ASA:  II         Operative findings:  The cervix easily admitted a large caliber Adal dilator without resistance.  Initially an 8 suction cannula was used but we switched to a 10 to remove all the intrauterine contents.  There is good hemostasis following procedure   Specimens removed: ID Type Source Tests Collected by Time   A :  Tissue Products of Conception TISSUE PATHOLOGY EXAM Emmett Travis MD 10/7/2019 0806      Fluid Intake: 600 mL   Output: Documented Output  Est. Blood Loss 200 mL  Urine Output 50 mL  NG/OG Output  0 mL    I/O this shift:  In: 600 [I.V.:600]  Out: 250 [Urine:50; Blood:200]     Blood products used: No   Drains: [REMOVED] Urethral Catheter Silicone 16 Fr. (Removed)      Implant Information: Nothing was implanted during the procedure   Complications: none   Intraoperative consult(s):    Condition: stable   Disposition: to PACU and then admit to Home          Assessment/Plan     29-year-old  2 para 0-1-0-2 presents with incomplete SAB.  She was diagnosed with a missed AB on Friday but was not n.p.o. and was scheduled for suction D&C today.  She started having vaginal bleeding yesterday.  It intensified this morning and become so severe that she presented early for her operation.  She was taken the operating room placed under general anesthesia.  Her legs were placed in Taran stirrups and she was prepped and draped in sterile fashion.  Open sided speculum placed  vagina anterior lip of cervix grasped with single-tooth dilator.  The cervix easily admitted a large bore Adal dilator.  Given the fact the ultrasound measured 7-1/2 weeks and 8 mm suction curette was then selected.  Multiple passes were made with tissue coming back through the tube.  I switched then to a 10 mm curette and more tissue was used to empty the uterus.  There is good hemostasis following procedure.  Sponge and lap counts are correct and patient was stable within the procedure.    Emmett Travis MD  10/7/2019  8:14 AM

## 2019-10-07 NOTE — ANESTHESIA PROCEDURE NOTES
Airway  Urgency: elective    Date/Time: 10/7/2019 7:55 AM  Airway not difficult    General Information and Staff    Patient location during procedure: OR  CRNA: Aurelia Montoya CRNA    Indications and Patient Condition  Indications for airway management: airway protection    Preoxygenated: yes  MILS maintained throughout  Mask difficulty assessment: 0 - not attempted    Final Airway Details  Final airway type: supraglottic airway      Successful airway: unique  Size 3    Number of attempts at approach: 1  Assessment: lips, teeth, and gum same as pre-op and atraumatic intubation

## 2019-10-08 LAB
CYTO UR: NORMAL
LAB AP CASE REPORT: NORMAL
PATH REPORT.FINAL DX SPEC: NORMAL
PATH REPORT.GROSS SPEC: NORMAL

## 2019-10-24 ENCOUNTER — OFFICE VISIT (OUTPATIENT)
Dept: OBSTETRICS AND GYNECOLOGY | Facility: CLINIC | Age: 29
End: 2019-10-24

## 2019-10-24 VITALS
DIASTOLIC BLOOD PRESSURE: 84 MMHG | BODY MASS INDEX: 32.4 KG/M2 | SYSTOLIC BLOOD PRESSURE: 122 MMHG | HEIGHT: 66 IN | WEIGHT: 201.6 LBS

## 2019-10-24 DIAGNOSIS — O03.9 SAB (SPONTANEOUS ABORTION): Primary | ICD-10-CM

## 2019-10-24 LAB
B-HCG UR QL: NEGATIVE
INTERNAL NEGATIVE CONTROL: NEGATIVE
INTERNAL POSITIVE CONTROL: POSITIVE
Lab: NORMAL

## 2019-10-24 PROCEDURE — 99024 POSTOP FOLLOW-UP VISIT: CPT | Performed by: OBSTETRICS & GYNECOLOGY

## 2019-10-24 PROCEDURE — 81025 URINE PREGNANCY TEST: CPT | Performed by: OBSTETRICS & GYNECOLOGY

## 2019-10-24 NOTE — PROGRESS NOTES
"      Aziza Sarkar is a 29 y.o. patient who presents for follow up of   Chief Complaint   Patient presents with   • Post-op       HPI 29-year-old -1-0-2 presents for 2-week follow-up after suction D&C for SAB.  Her bleeding is stopped.  She reports no pain and no fevers.    The following portions of the patient's history were reviewed and updated as appropriate: allergies, current medications and problem list.    Review of Systems   Constitutional: Negative for appetite change, fever and unexpected weight change.   HENT: Negative for congestion and sore throat.    Respiratory: Negative for cough and shortness of breath.    Cardiovascular: Negative for chest pain and palpitations.   Gastrointestinal: Negative for abdominal distention, abdominal pain, constipation, diarrhea, nausea and vomiting.   Endocrine: Negative.    Genitourinary: Negative for dyspareunia, menstrual problem, pelvic pain and vaginal discharge.   Skin: Negative.    Neurological: Negative for dizziness and syncope.   Hematological: Negative.    Psychiatric/Behavioral: Negative for dysphoric mood and sleep disturbance. The patient is not nervous/anxious.        /84   Ht 167.6 cm (65.98\")   Wt 91.4 kg (201 lb 9.6 oz)   LMP  (LMP Unknown)   Breastfeeding? No   BMI 32.55 kg/m²     Physical Exam   Constitutional: She is oriented to person, place, and time. She appears well-developed and well-nourished.   HENT:   Head: Normocephalic and atraumatic.   Pulmonary/Chest: Effort normal. No respiratory distress.   Abdominal: Soft. She exhibits no distension and no mass. There is no tenderness. There is no rebound and no guarding.   Musculoskeletal: Normal range of motion.   Neurological: She is alert and oriented to person, place, and time.   Skin: Skin is warm and dry.   Psychiatric: She has a normal mood and affect. Her behavior is normal. Judgment and thought content normal.   Nursing note and vitals reviewed.    Pathology was reviewed with " patient.    Assessment/Plan    Aziza was seen today for post-op.    Diagnoses and all orders for this visit:    SAB (spontaneous )  -     POC Pregnancy, Urine    Patient will leave urine hCG.  We will follow Quance down to 0.  If it is still positive we will draw a quantitative hCG.  Urine hCG is negative.  No quant needed.    No Follow-up on file.      Emmett Travis MD  10/24/2019  10:59 AM

## 2019-11-18 RX ORDER — SUMATRIPTAN 100 MG/1
TABLET, FILM COATED ORAL
Qty: 20 TABLET | Refills: 2 | Status: SHIPPED | OUTPATIENT
Start: 2019-11-18 | End: 2021-04-19 | Stop reason: SDUPTHER

## 2020-06-15 ENCOUNTER — OFFICE VISIT (OUTPATIENT)
Dept: OBSTETRICS AND GYNECOLOGY | Age: 30
End: 2020-06-15

## 2020-06-15 VITALS
BODY MASS INDEX: 33.43 KG/M2 | SYSTOLIC BLOOD PRESSURE: 126 MMHG | DIASTOLIC BLOOD PRESSURE: 72 MMHG | WEIGHT: 208 LBS | HEIGHT: 66 IN

## 2020-06-15 DIAGNOSIS — N64.4 BREAST TENDERNESS IN FEMALE: ICD-10-CM

## 2020-06-15 DIAGNOSIS — N92.6 IRREGULAR MENSES: ICD-10-CM

## 2020-06-15 DIAGNOSIS — Z01.419 ENCOUNTER FOR GYNECOLOGICAL EXAMINATION: Primary | ICD-10-CM

## 2020-06-15 DIAGNOSIS — R63.8 INCREASED BMI: ICD-10-CM

## 2020-06-15 PROBLEM — O21.9 NAUSEA AND VOMITING DURING PREGNANCY: Status: RESOLVED | Noted: 2019-09-05 | Resolved: 2020-06-15

## 2020-06-15 PROBLEM — O03.9 SAB (SPONTANEOUS ABORTION): Status: RESOLVED | Noted: 2019-10-04 | Resolved: 2020-06-15

## 2020-06-15 PROCEDURE — 99395 PREV VISIT EST AGE 18-39: CPT | Performed by: OBSTETRICS & GYNECOLOGY

## 2020-06-15 NOTE — PROGRESS NOTES
Subjective     Chief Complaint   Patient presents with   • Gynecologic Exam     New pt: annual,discuss sab 10/19,lump in rt.breast         History of Present Illness      Aziza Sarkar is a very pleasant  30 y.o. female who presents for annual exam.  Mammo Exam none, Contraception none, Exercise 5 times a week    Patient is new to our practice, she has a history of migraines, irregular menses, and is concerned about an area on her right breast that she is noticed for the past 2 to 3 months.  She does not remember having any injury or trauma.  She had a miscarriage last year has 4-1/2-year-old twin girls only breast-fed for a couple weeks with them.  She does not have any nipple bleeding but thinks she might have a little galactorrhea on both sides from time to time.  She is up-to-date on wellness labs for her 's work..      Obstetric History:  OB History        3    Para   1    Term           1    AB   1    Living   2       SAB   1    TAB        Ectopic        Molar        Multiple   1    Live Births   2               Menstrual History:     Patient's last menstrual period was 2020 (approximate).       Sexual History:       Past Medical History:   Diagnosis Date   • Migraine    • Ovarian cyst rupture          Past Surgical History:   Procedure Laterality Date   •  SECTION     • D&C WITH SUCTION N/A 10/7/2019    Procedure: DILATATION AND CURETTAGE WITH SUCTION;  Surgeon: Emmett Travis MD;  Location: [facility];  Service: Obstetrics/Gynecology   • WISDOM TOOTH EXTRACTION         SOCIAL Hx:      The following portions of the patient's history were reviewed and updated as appropriate: allergies, current medications, past family history, past medical history, past social history, past surgical history and problem list.    Review of Systems        Except as outlined in history of physical illness, patient denies any changes in her GYN, , GI systems.  All other systems  "reviewed were negative.         Objective   Physical Exam    /72   Ht 167.6 cm (66\")   Wt 94.3 kg (208 lb)   LMP 2020 (Approximate)   Breastfeeding No   BMI 33.57 kg/m²     General: Patient is alert and oriented and appears overall healthy  Neck: Is supple without thyromegaly, no carotid bruits and no lymphadenopathy  Lungs: Clear bilaterally, no wheezing, rhonchi, or rales.  Respiratory rate is normal  Breast: Even, symmetrical, no lymphadenopathy, no retraction, no masses or cysts, right breast mostly felt like glandular tissue in the right upper outer quadrant, I could not appreciate any distinct mass  Heart: Regular rate and rhythm are appreciated, no murmurs or rubs are heard  Abdomen: Is soft, without organomegaly, bowel sounds are positive, there is no                                rebound or guarding and palpation does not produce any discomfort  Back: Nontender without CVA tenderness  Pelvic: External genitalia appear normal and consistent with mature female.  BUS normal                            Vagina is clean dry without discharge and appears adequately estrogenized, no               lesions or masses are present                         Cervix is noninflamed without discharge or lesions.  There is no cervical motion             tenderness.                Uterus is nonenlarged, without tenderness, and no masses or abnormalities are  present               Adnexa are non-enlarged, non tender               Rectal exam reveals adequate sphincter tone and no masses or lesions are                     appreciated on digital rectal examination.      Annual Well Woman Exam  Patient Active Problem List   Diagnosis   • History of  delivery:twin gestation, desires RLTCS                 Assessment/Plan   Aziza was seen today for gynecologic exam.    Diagnoses and all orders for this visit:    Encounter for gynecological examination  -     IGP, Apt HPV,rfx 16 / 18,45    Breast tenderness in " female  As outlined above physical exam is reassuring, we will check a ultrasound of the right breast  Irregular menses  -     US Breast Right Complete; Future  -     Thyroid Cascade Profile  -     Prolactin  Patient is happy with her irregular menses and has she typically has migraines with each menstrual cycle.  We discussed the importance of not going for prolonged periods of time without a menstrual cycle and she understands that.  They are uncertain whether they want a more children but will call if she wants to go down that path  Increased BMI    Migraine headaches-does not want any further work-up or therapy at this stage but I did discuss following up on neurology consult that her PCP had discussed    Discussed today's findings and concerns with patient.  Continue to recommend regular exercise including cardiovascular and resistance training as well as  breast self-exam. Wellness lab, mammography, & pap smear, in accordance with age guidelines.    I have encouraged her to call for today's test results if she has not received them within 10 days.  Patient is advised to call with any change in her condition or with any other questions, otherwise return  for annual examination.

## 2020-06-16 LAB
PROLACTIN SERPL-MCNC: 9.1 NG/ML (ref 4.8–23.3)
TSH SERPL DL<=0.005 MIU/L-ACNC: 1.74 UIU/ML (ref 0.45–4.5)

## 2020-06-17 ENCOUNTER — TELEPHONE (OUTPATIENT)
Dept: OBSTETRICS AND GYNECOLOGY | Age: 30
End: 2020-06-17

## 2020-06-17 LAB
CYTOLOGIST CVX/VAG CYTO: NORMAL
CYTOLOGY CVX/VAG DOC CYTO: NORMAL
CYTOLOGY CVX/VAG DOC THIN PREP: NORMAL
DX ICD CODE: NORMAL
HIV 1 & 2 AB SER-IMP: NORMAL
HPV I/H RISK 4 DNA CVX QL PROBE+SIG AMP: NEGATIVE
OTHER STN SPEC: NORMAL
STAT OF ADQ CVX/VAG CYTO-IMP: NORMAL

## 2020-06-17 NOTE — TELEPHONE ENCOUNTER
----- Message from Piero Lafleur MD sent at 6/16/2020 10:11 AM EDT -----  Please notify pt. That labs are with in normal limits

## 2020-06-26 ENCOUNTER — HOSPITAL ENCOUNTER (OUTPATIENT)
Dept: ULTRASOUND IMAGING | Facility: HOSPITAL | Age: 30
Discharge: HOME | End: 2020-06-26

## 2020-06-26 ENCOUNTER — HOSPITAL ENCOUNTER (OUTPATIENT)
Dept: MAMMOGRAPHY | Facility: HOSPITAL | Age: 30
Discharge: HOME | End: 2020-06-26
Admitting: OBSTETRICS & GYNECOLOGY

## 2020-06-26 DIAGNOSIS — N92.6 IRREGULAR MENSES: ICD-10-CM

## 2020-06-26 DIAGNOSIS — N64.4 BREAST TENDERNESS IN FEMALE: ICD-10-CM

## 2020-06-26 PROCEDURE — 77066 DX MAMMO INCL CAD BI: CPT

## 2020-06-26 PROCEDURE — 76642 ULTRASOUND BREAST LIMITED: CPT

## 2020-07-01 ENCOUNTER — TELEPHONE (OUTPATIENT)
Dept: OBSTETRICS AND GYNECOLOGY | Age: 30
End: 2020-07-01

## 2020-07-01 NOTE — TELEPHONE ENCOUNTER
----- Message from Piero Lafleur MD sent at 6/30/2020 11:05 AM EDT -----  Please notify pt. That labs are with in normal limits

## 2020-09-30 ENCOUNTER — TELEPHONE (OUTPATIENT)
Dept: OBSTETRICS AND GYNECOLOGY | Age: 30
End: 2020-09-30

## 2020-10-02 RX ORDER — NORETHINDRONE ACETATE AND ETHINYL ESTRADIOL 1MG-20(21)
1 KIT ORAL DAILY
Qty: 28 TABLET | Refills: 12 | Status: SHIPPED | OUTPATIENT
Start: 2020-10-02 | End: 2021-12-03 | Stop reason: SDUPTHER

## 2020-11-23 ENCOUNTER — TELEPHONE (OUTPATIENT)
Dept: OBSTETRICS AND GYNECOLOGY | Age: 30
End: 2020-11-23

## 2021-04-16 ENCOUNTER — BULK ORDERING (OUTPATIENT)
Dept: CASE MANAGEMENT | Facility: OTHER | Age: 31
End: 2021-04-16

## 2021-04-16 DIAGNOSIS — Z23 IMMUNIZATION DUE: ICD-10-CM

## 2021-04-19 ENCOUNTER — TELEPHONE (OUTPATIENT)
Dept: OBSTETRICS AND GYNECOLOGY | Age: 31
End: 2021-04-19

## 2021-04-19 RX ORDER — SUMATRIPTAN 100 MG/1
TABLET, FILM COATED ORAL
Qty: 20 TABLET | Refills: 2 | Status: SHIPPED | OUTPATIENT
Start: 2021-04-19 | End: 2021-12-03 | Stop reason: SDUPTHER

## 2021-04-19 NOTE — TELEPHONE ENCOUNTER
She can take them continuously, she can simply skip the placebo week, if we need to give extra refills we can do so

## 2021-04-19 NOTE — TELEPHONE ENCOUNTER
Pt calls with c/o not starting her cycle and intense migraines with cycle. Pt states her cycle was supposed to start either last week and is wondering if it is alright to keep taking her BCP for next month. States she has taken 3 home pregnancy tests and they are all negative.  Pt also states around her cycle she has 4-6 migraines with no relief after taking max amount of excedrin. Pt is wanting to take Sumatriptan, she has taken it in the past. Pharmacy verified. Please advise. Pt is at work until 2:30.

## 2021-04-19 NOTE — TELEPHONE ENCOUNTER
Yes it is okay to go ahead and keep taking her birth control pills even though she skipped this menses.  And not sure from the message if patient was asking for refill of her sumatriptan, however I did do that, and it is okay for her to take

## 2021-04-19 NOTE — TELEPHONE ENCOUNTER
Patient not'd.She wants to know if she can take her ocp's continuous? If so,she will need a new rx sent in for those.She thought that question was passed on in the previous message.She states she gets 4-6 migraines every cycle.

## 2021-12-03 ENCOUNTER — TELEPHONE (OUTPATIENT)
Dept: OBSTETRICS AND GYNECOLOGY | Age: 31
End: 2021-12-03

## 2021-12-03 RX ORDER — SUMATRIPTAN 100 MG/1
TABLET, FILM COATED ORAL
Qty: 20 TABLET | Refills: 2 | Status: SHIPPED | OUTPATIENT
Start: 2021-12-03 | End: 2022-06-15 | Stop reason: SDUPTHER

## 2021-12-03 RX ORDER — NORETHINDRONE ACETATE AND ETHINYL ESTRADIOL 1MG-20(21)
1 KIT ORAL DAILY
Qty: 28 TABLET | Refills: 3 | Status: SHIPPED | OUTPATIENT
Start: 2021-12-03 | End: 2022-06-15

## 2021-12-03 NOTE — TELEPHONE ENCOUNTER
PT calls needing a refill of her BCP and imitrex. Pt last annual exam 06/15/2020 and is scheduled for next first available with dr Lafleur on 02/22/2022, please advise

## 2022-06-15 ENCOUNTER — OFFICE VISIT (OUTPATIENT)
Dept: OBSTETRICS AND GYNECOLOGY | Age: 32
End: 2022-06-15

## 2022-06-15 VITALS
DIASTOLIC BLOOD PRESSURE: 68 MMHG | WEIGHT: 205 LBS | SYSTOLIC BLOOD PRESSURE: 118 MMHG | BODY MASS INDEX: 34.16 KG/M2 | HEIGHT: 65 IN

## 2022-06-15 DIAGNOSIS — Z80.3 FAMILY HISTORY OF BREAST CANCER IN MOTHER: ICD-10-CM

## 2022-06-15 DIAGNOSIS — Z30.41 ORAL CONTRACEPTIVE PILL SURVEILLANCE: ICD-10-CM

## 2022-06-15 DIAGNOSIS — Z12.4 SCREENING FOR CERVICAL CANCER: ICD-10-CM

## 2022-06-15 DIAGNOSIS — Z01.419 WELL WOMAN EXAM WITH ROUTINE GYNECOLOGICAL EXAM: Primary | ICD-10-CM

## 2022-06-15 PROCEDURE — 99395 PREV VISIT EST AGE 18-39: CPT | Performed by: NURSE PRACTITIONER

## 2022-06-15 RX ORDER — NORGESTIMATE AND ETHINYL ESTRADIOL 0.25-0.035
1 KIT ORAL DAILY
Qty: 28 TABLET | Refills: 12 | Status: SHIPPED | OUTPATIENT
Start: 2022-06-15 | End: 2022-06-15

## 2022-06-15 RX ORDER — NORGESTIMATE AND ETHINYL ESTRADIOL 0.25-0.035
1 KIT ORAL DAILY
Qty: 28 TABLET | Refills: 12 | Status: SHIPPED | OUTPATIENT
Start: 2022-06-15 | End: 2022-08-22

## 2022-06-15 RX ORDER — SUMATRIPTAN 100 MG/1
TABLET, FILM COATED ORAL
Qty: 20 TABLET | Refills: 2 | Status: SHIPPED | OUTPATIENT
Start: 2022-06-15

## 2022-06-15 NOTE — PROGRESS NOTES
Subjective     Chief Complaint   Patient presents with   • Gynecologic Exam     AE, last pap 06/15/2020 neg/hpv neg, mg 2020       History of Present Illness    Aziza Sarkar is a 32 y.o.  who presents for annual exam.    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, this has been stressful  Admits to stress eating, poor diet, planning on working at this and losing weight  OCP's for contraception  Did not take it last month  Wants to try a different formulation   Notices some increased mood changes with the one she is on  Takes continuously and has a withdrawal bleed every 3 months   Mom was diagnosed with breast cancer two years ago (age 62), negative genetic testing   No other GYN concerns or complaints     Obstetric History:  OB History        4    Para   3    Term   2       1    AB   1    Living   2       SAB   1    IAB        Ectopic        Molar        Multiple   1    Live Births   2               Menstrual History:     Patient's last menstrual period was 2022.         Current contraception: OCP (estrogen/progesterone)  History of abnormal Pap smear: no  Received Gardasil immunization: no  Perform regular self breast exam: yes - .  Family history of uterine or ovarian cancer: no  Family History of colon cancer: no  Family history of breast cancer: no    Mammogram: not indicated.  Colonoscopy: not indicated.  DEXA: not indicated.    Exercise: moderately active  Calcium/Vitamin D: adequate intake    The following portions of the patient's history were reviewed and updated as appropriate: allergies, current medications, past family history, past medical history, past social history, past surgical history and problem list.    Review of Systems   Constitutional: Negative.    Respiratory: Negative.    Cardiovascular: Negative.    Gastrointestinal: Negative.    Genitourinary: Negative.    Skin: Negative.    Psychiatric/Behavioral: Positive for  agitation. The patient is nervous/anxious.            Objective   Physical Exam  Constitutional:       General: She is awake.      Appearance: Normal appearance. She is well-developed. She is obese.   HENT:      Head: Normocephalic and atraumatic.      Nose: Nose normal.   Neck:      Thyroid: No thyroid mass, thyromegaly or thyroid tenderness.   Cardiovascular:      Rate and Rhythm: Normal rate and regular rhythm.      Pulses: Normal pulses.      Heart sounds: Normal heart sounds.   Pulmonary:      Effort: Pulmonary effort is normal.      Breath sounds: Normal breath sounds.   Chest:   Breasts: Breasts are symmetrical.      Right: Normal. No swelling, bleeding, inverted nipple, mass, nipple discharge, skin change, tenderness or supraclavicular adenopathy.      Left: Normal. No swelling, bleeding, inverted nipple, mass, nipple discharge, skin change, tenderness or supraclavicular adenopathy.       Abdominal:      General: Abdomen is flat. Bowel sounds are normal.      Palpations: Abdomen is soft.      Tenderness: There is no abdominal tenderness.   Genitourinary:     General: Normal vulva.      Labia:         Right: No rash, tenderness, lesion or injury.         Left: No rash, tenderness, lesion or injury.       Urethra: No prolapse, urethral pain, urethral swelling or urethral lesion.      Vagina: Normal. No signs of injury. No vaginal discharge, erythema, tenderness, bleeding, lesions or prolapsed vaginal walls.      Cervix: No discharge, friability, lesion, erythema or cervical bleeding.      Uterus: Normal. Not enlarged, not tender and no uterine prolapse.       Adnexa: Right adnexa normal and left adnexa normal.        Right: No mass, tenderness or fullness.          Left: No mass, tenderness or fullness.        Rectum: Normal. No mass.      Comments: Exam limited due to body habitus   Musculoskeletal:      Cervical back: Normal range of motion and neck supple.   Lymphadenopathy:      Upper Body:      Right  "upper body: No supraclavicular adenopathy.      Left upper body: No supraclavicular adenopathy.   Skin:     General: Skin is warm and dry.   Neurological:      General: No focal deficit present.      Mental Status: She is alert and oriented to person, place, and time.   Psychiatric:         Mood and Affect: Mood normal.         Behavior: Behavior normal. Behavior is cooperative.         Thought Content: Thought content normal.         Judgment: Judgment normal.         /68   Ht 165.1 cm (65\")   Wt 93 kg (205 lb)   LMP 06/08/2022   BMI 34.11 kg/m²     Assessment & Plan   Diagnoses and all orders for this visit:    1. Well woman exam with routine gynecological exam (Primary)    2. Screening for cervical cancer  -     IGP, Apt HPV,rfx 16 / 18,45    3. Family history of breast cancer in mother    4. Oral contraceptive pill surveillance    Other orders  -     SUMAtriptan (IMITREX) 100 MG tablet; Take 1 po, many repeat in 2 hrs x 1  Dispense: 20 tablet; Refill: 2  -     Discontinue: norgestimate-ethinyl estradiol (Sprintec 28) 0.25-35 MG-MCG per tablet; Take 1 tablet by mouth Daily.  Dispense: 28 tablet; Refill: 12  -     norgestimate-ethinyl estradiol (ORTHO-CYCLEN) 0.25-35 MG-MCG per tablet; Take 1 tablet by mouth Daily.  Dispense: 28 tablet; Refill: 12        All questions answered.  Breast self exam technique reviewed and patient encouraged to perform self-exam monthly.  Discussed healthy lifestyle modifications.  Recommended 30 minutes of aerobic exercise five times per week.  Discussed calcium needs to prevent osteoporosis.    -Pap smear today  -Will try sprintec  -F/u 1 year, sooner prn                "

## 2022-08-22 RX ORDER — NORETHINDRONE ACETATE AND ETHINYL ESTRADIOL, ETHINYL ESTRADIOL AND FERROUS FUMARATE 1MG-10(24)
1 KIT ORAL DAILY
Qty: 28 TABLET | Refills: 11 | Status: SHIPPED | OUTPATIENT
Start: 2022-08-22 | End: 2022-08-23 | Stop reason: SDUPTHER

## 2022-08-23 RX ORDER — NORETHINDRONE ACETATE AND ETHINYL ESTRADIOL, ETHINYL ESTRADIOL AND FERROUS FUMARATE 1MG-10(24)
1 KIT ORAL DAILY
Qty: 28 TABLET | Refills: 11 | Status: SHIPPED | OUTPATIENT
Start: 2022-08-23 | End: 2023-08-23

## 2022-08-23 NOTE — TELEPHONE ENCOUNTER
(Link Pt) Pt called, requesting refill on SUMAtriptan (IMITREX) 100 MG tablet.  Pharmacy verified.    [phone]       
Pt notified.  Pt states Dr. Vincent is no longer her doctor but she will call to see if they will refill.  
Male

## 2023-01-19 DIAGNOSIS — G43.909 MIGRAINE WITHOUT STATUS MIGRAINOSUS, NOT INTRACTABLE, UNSPECIFIED MIGRAINE TYPE: Primary | ICD-10-CM

## 2023-04-10 ENCOUNTER — OFFICE VISIT (OUTPATIENT)
Dept: NEUROLOGY | Facility: CLINIC | Age: 33
End: 2023-04-10
Payer: COMMERCIAL

## 2023-04-10 VITALS
HEIGHT: 65 IN | DIASTOLIC BLOOD PRESSURE: 72 MMHG | WEIGHT: 209 LBS | SYSTOLIC BLOOD PRESSURE: 128 MMHG | OXYGEN SATURATION: 98 % | BODY MASS INDEX: 34.82 KG/M2 | HEART RATE: 74 BPM

## 2023-04-10 DIAGNOSIS — G43.909 MIGRAINE WITHOUT STATUS MIGRAINOSUS, NOT INTRACTABLE, UNSPECIFIED MIGRAINE TYPE: Primary | ICD-10-CM

## 2023-04-10 PROCEDURE — 99204 OFFICE O/P NEW MOD 45 MIN: CPT | Performed by: STUDENT IN AN ORGANIZED HEALTH CARE EDUCATION/TRAINING PROGRAM

## 2023-04-10 RX ORDER — RIZATRIPTAN BENZOATE 10 MG/1
10 TABLET ORAL ONCE AS NEEDED
Qty: 9 TABLET | Refills: 3 | Status: SHIPPED | OUTPATIENT
Start: 2023-04-10 | End: 2023-04-14 | Stop reason: SDUPTHER

## 2023-04-10 NOTE — PROGRESS NOTES
"Chief Complaint   Patient presents with   • Migraine       Patient ID: Aziza Sarkar is a 33 y.o. female.    HPI:  Aziza Sarkar is a 33-year-old female who presents today as a referral from her primary care provider for evaluation of migraine headaches.    The patient reports that she has had migraines since she was 6 years old. She states that she typically gets them around her menstrual cycle. She notes that she had difficulty conceiving, and her body never has done what it is supposed to with her menstrual cycle. She reports that she was on hormone medications for 3 to 4 years, and her body was \"doing crazy things.\" She states that she had twins. She reports that for the first year of having twins, she had a lot of sleep deprivation. She states that she started getting migraines more, and most of them were around her menstrual cycle again. She reports that her OB/GYN in [place] prescribed her sumatriptan because she did not want to take daily medication. She states that they were not bad enough that they were debilitating because she stayed home. She reports that they moved to [place] 4 to 5 years ago, and she believes that the weather change has caused her to have allergies. She states that she is originally from [place], and she never had allergies. She reports that they moved to [place], and she got them, and they have gotten worse here. She states that in the last couple of years, she was a , and then last year she was a , and this year she is a . She reports that she changed jobs, and she graduated as a . She states that she always intended to be with the older kids, but the way things worked out it that helped with her younger kids. She reports that between the environmental factors, changing jobs, and being in a high stress job right now, she has a lot of kids to meet their needs, and on top of that, the weather has been just " the pressure. She states that she has noticed that she has had migraines outside of her normal menstrual cycle times. She reports that they have not been frequent every day, but when she does get them, they last a couple of days. She states that she has to take 3 to 4 sumatriptan in that span to get rid of it. She reports that when she do that, she is really tired, and she has to be in a dark room. She states that a couple of them have landed on the weekends. She reports that they have not completely ruined what she is doing for school, but socially her family suffers that. She states that she is trying to find some answers, and she inquires if she should start taking something daily. She reports that in 03/2023, she had 2 headache days. She states that she did not record any in 02/2023. She reports that she also did not have a menstrual cycle. She states that she do not have a menstrual cycle, but she takes medication every 4 months so that she do not have as many menstrual cycles. She reports that she had a miscarriage 3 years ago, and they have not decided if she wants to have more children. She states that she takes a pregnancy test before she takes the sumatriptan. She reports that the migraines are the worst in the frontal, bifrontal, and the forehead. She states that she can usually feel it happening, and she can not describe how.  She states that it will wake her up in the middle of the night if she starts to get one. She reports that if she wakes up in the morning, she can take a sumatriptan, and then go back to sleep for an hour before she gets ready for school. She states that typically that will take care of it until the afternoon, and she has to take another one most times. She reports that she do not go to a job where she is just hanging out in the dark. She states that bright light and sound make her headaches worse. She denies nausea with her headaches. She states that sometimes she will see spots  "before, during, and during. She denies speech changes, dizziness, or paralysis on one side of her body. She denies numbness. She states that they are worsened by physical activity. She reports that if she has a migraine, she has taken the sumatriptan, and it has not quite gotten rid of it. She states that she has to go help the girls do something, or if she is bending over. She reports that on a normal migraine, it is a 5 or 6 out of 10. She states that the pain is a throbbing pain. She reports that sometimes it feels like \"little drum or boys in her skull.\" She states that it is not usually an intense sharp pain, but it feels like a thud. She denies a history of heart attack or stroke. She denies a history of heart disease, coronary artery disease, or chest pain. She denies smoking. She denies a history of diabetes, high blood pressure, or high cholesterol. She states that she is clean living. She reports that she drinks alcohol on occasion. She states that she usually gets a very bad headache after she drinks it. She reports that if she drinks anything, it will usually be vodka mule or a white claw. She states that she is going to limit it to 1 because she knows the next day she will have a headache. She reports that she has to stay away from wine because it will the worst headache afterwards. She denies trying any other prescription medications besides sumatriptan. She states that she is in a monogamous relationship with her . She reports that she drinks 48 to 60 ounces of water a day at school, and she tries to drink a glass after dinner. She states that she likes to have a Coke in the afternoon.  She reports that when she started tracking her migraines, she noticed that she has headaches pretty frequently, and when the weather changes, she can feel her head hurt more. She states that when she was younger, she was having migraines, and her mother took her to the neurologist, and she was told that she had " cluster headaches. She reports that as she got older, she did not have that. She states that in 01/2023, she had one, and it lasted for 4 or 5 days. She reports that sumatriptan was not taking care of it, and she was out, and she missed a couple of days at school. She states that it was a continuous headache.    The patient declines samples of newer migraine medications at this time.    The following portions of the patient's history were reviewed and updated as appropriate: allergies, current medications, past family history, past medical history, past social history, past surgical history and problem list.    Review of Systems   Musculoskeletal: Negative for back pain, gait problem and neck pain.   Allergic/Immunologic: Positive for environmental allergies. Negative for food allergies and immunocompromised state.   Neurological: Positive for headaches. Negative for dizziness, tremors, seizures, syncope, facial asymmetry, speech difficulty, weakness, light-headedness and numbness.   Psychiatric/Behavioral: Negative for agitation, behavioral problems, confusion, decreased concentration, dysphoric mood, hallucinations, self-injury, sleep disturbance and suicidal ideas. The patient is not nervous/anxious and is not hyperactive.              Vitals:    04/10/23 1534   BP: 128/72   Pulse: 74   SpO2: 98%       Neurologic Exam     Mental Status   Speech: speech is normal   Level of consciousness: alert    Cranial Nerves     CN II   Visual fields full to confrontation.     CN III, IV, VI   Pupils are equal, round, and reactive to light.  Extraocular motions are normal.     CN V   Facial sensation intact.     CN VII   Facial expression full, symmetric.     CN VIII   Hearing: intact    CN IX, X   Palate: symmetric    CN XI   Right trapezius strength: normal  Left trapezius strength: normal    CN XII   Tongue: not atrophic  Fasciculations: absent  Tongue deviation: none    Motor Exam   Muscle bulk: normal    Strength   Right  deltoid: 5/5  Left deltoid: 5/5  Right biceps: 5/5  Left biceps: 5/5  Right triceps: 5/5  Left triceps: 5/5  Right iliopsoas: 5/5  Left iliopsoas: 5/5  Right quadriceps: 5/5  Left quadriceps: 5/5  Right hamstrin/5  Left hamstrin/5  Right anterior tibial: 5/5  Left anterior tibial: 5/5  Right gastroc: 5/5  Left gastroc: 5/5Grip 5 out of 5 bilaterally     Sensory Exam   Right arm light touch: normal  Left arm light touch: normal  Right leg light touch: normal  Left leg light touch: normal    Gait, Coordination, and Reflexes     Coordination   Finger to nose coordination: normal  Heel to shin coordination: normal    Reflexes   Right brachioradialis: 2+  Left brachioradialis: 2+  Right biceps: 2+  Left biceps: 2+  Right patellar: 2+  Left patellar: 2+  Right achilles: 2+  Left achilles: 2+Toes neutral       Physical Exam  Eyes:      Extraocular Movements: EOM normal.      Pupils: Pupils are equal, round, and reactive to light.   Neurological:      Coordination: Finger-Nose-Finger Test and Heel to Shin Test normal.      Deep Tendon Reflexes:      Reflex Scores:       Bicep reflexes are 2+ on the right side and 2+ on the left side.       Brachioradialis reflexes are 2+ on the right side and 2+ on the left side.       Patellar reflexes are 2+ on the right side and 2+ on the left side.       Achilles reflexes are 2+ on the right side and 2+ on the left side.  Psychiatric:         Speech: Speech normal.         Procedures    Assessment/Plan:    1. Migraine headaches.  - Discussed headache hygiene, drinking 8 glasses of water a day, and exercising 20 minutes a day.  - Discussed nonpharmaceutical preventatives such as riboflavin 200 mg twice a day, and frovatriptan/rizatriptan. After discussion, will initiate rizatriptan 10mg PRN for migraines  - Discussed oral medications.  - Discussed Botox injections.  - She will follow up in 3 to 4 months.    -Patient has a chronic condition of migraine headaches. It is worsening  overall she will try riboflavin as preventative as well as prescription Medications were managed during this visit.     Diagnoses and all orders for this visit:    1. Migraine without status migrainosus, not intractable, unspecified migraine type (Primary)    Other orders  -     Discontinue: rizatriptan (Maxalt) 10 MG tablet; Take 1 tablet by mouth 1 (One) Time As Needed for Migraine for up to 120 doses. May repeat in 2 hours if needed  Dispense: 9 tablet; Refill: 3           Shon Franco MD    Transcribed from ambient dictation for Shon Franco MD by Sarah Pritchard.  04/10/23   16:43 EDT    Patient or patient representative verbalized consent to the visit recording.

## 2023-04-10 NOTE — LETTER
"April 24, 2023     KAIT Harris  [address] [facility] [address]    Patient: Aziza Sarkar   YOB: 1990   Date of Visit: 4/10/2023       Dear KAIT Yu:    Thank you for referring Aziza Sarkar to me for evaluation. Below are the relevant portions of my assessment and plan of care.    If you have questions, please do not hesitate to call me. I look forward to following Aziza along with you.         Sincerely,        Shon Franco MD        CC: [facility]    Shon Franco MD  04/24/23 1126  Signed  Chief Complaint   Patient presents with   • Migraine       Patient ID: Aziza Sarkar is a 33 y.o. female.    HPI:  Aziza Sarkar is a 33-year-old female who presents today as a referral from her primary care provider for evaluation of migraine headaches.    The patient reports that she has had migraines since she was 6 years old. She states that she typically gets them around her menstrual cycle. She notes that she had difficulty conceiving, and her body never has done what it is supposed to with her menstrual cycle. She reports that she was on hormone medications for 3 to 4 years, and her body was \"doing crazy things.\" She states that she had twins. She reports that for the first year of having twins, she had a lot of sleep deprivation. She states that she started getting migraines more, and most of them were around her menstrual cycle again. She reports that her OB/GYN in [place] prescribed her sumatriptan because she did not want to take daily medication. She states that they were not bad enough that they were debilitating because she stayed home. She reports that they moved to [place] 4 to 5 years ago, and she believes that the weather change has caused her to have allergies. She states that she is originally from [place], and she never had allergies. She reports that they moved to [place], and she got them, and they have gotten worse here. She states that in the last couple of " years, she was a , and then last year she was a , and this year she is a . She reports that she changed jobs, and she graduated as a . She states that she always intended to be with the older kids, but the way things worked out it that helped with her younger kids. She reports that between the environmental factors, changing jobs, and being in a high stress job right now, she has a lot of kids to meet their needs, and on top of that, the weather has been just the pressure. She states that she has noticed that she has had migraines outside of her normal menstrual cycle times. She reports that they have not been frequent every day, but when she does get them, they last a couple of days. She states that she has to take 3 to 4 sumatriptan in that span to get rid of it. She reports that when she do that, she is really tired, and she has to be in a dark room. She states that a couple of them have landed on the weekends. She reports that they have not completely ruined what she is doing for school, but socially her family suffers that. She states that she is trying to find some answers, and she inquires if she should start taking something daily. She reports that in 03/2023, she had 2 headache days. She states that she did not record any in 02/2023. She reports that she also did not have a menstrual cycle. She states that she do not have a menstrual cycle, but she takes medication every 4 months so that she do not have as many menstrual cycles. She reports that she had a miscarriage 3 years ago, and they have not decided if she wants to have more children. She states that she takes a pregnancy test before she takes the sumatriptan. She reports that the migraines are the worst in the frontal, bifrontal, and the forehead. She states that she can usually feel it happening, and she can not describe how.  She states that it will wake her up in the  "middle of the night if she starts to get one. She reports that if she wakes up in the morning, she can take a sumatriptan, and then go back to sleep for an hour before she gets ready for school. She states that typically that will take care of it until the afternoon, and she has to take another one most times. She reports that she do not go to a job where she is just hanging out in the dark. She states that bright light and sound make her headaches worse. She denies nausea with her headaches. She states that sometimes she will see spots before, during, and during. She denies speech changes, dizziness, or paralysis on one side of her body. She denies numbness. She states that they are worsened by physical activity. She reports that if she has a migraine, she has taken the sumatriptan, and it has not quite gotten rid of it. She states that she has to go help the girls do something, or if she is bending over. She reports that on a normal migraine, it is a 5 or 6 out of 10. She states that the pain is a throbbing pain. She reports that sometimes it feels like \"little drum or boys in her skull.\" She states that it is not usually an intense sharp pain, but it feels like a thud. She denies a history of heart attack or stroke. She denies a history of heart disease, coronary artery disease, or chest pain. She denies smoking. She denies a history of diabetes, high blood pressure, or high cholesterol. She states that she is clean living. She reports that she drinks alcohol on occasion. She states that she usually gets a very bad headache after she drinks it. She reports that if she drinks anything, it will usually be vodka mule or a white claw. She states that she is going to limit it to 1 because she knows the next day she will have a headache. She reports that she has to stay away from wine because it will the worst headache afterwards. She denies trying any other prescription medications besides sumatriptan. She states that " she is in a monogamous relationship with her . She reports that she drinks 48 to 60 ounces of water a day at school, and she tries to drink a glass after dinner. She states that she likes to have a Coke in the afternoon.  She reports that when she started tracking her migraines, she noticed that she has headaches pretty frequently, and when the weather changes, she can feel her head hurt more. She states that when she was younger, she was having migraines, and her mother took her to the neurologist, and she was told that she had cluster headaches. She reports that as she got older, she did not have that. She states that in 01/2023, she had one, and it lasted for 4 or 5 days. She reports that sumatriptan was not taking care of it, and she was out, and she missed a couple of days at school. She states that it was a continuous headache.    The patient declines samples of newer migraine medications at this time.    The following portions of the patient's history were reviewed and updated as appropriate: allergies, current medications, past family history, past medical history, past social history, past surgical history and problem list.    Review of Systems   Musculoskeletal: Negative for back pain, gait problem and neck pain.   Allergic/Immunologic: Positive for environmental allergies. Negative for food allergies and immunocompromised state.   Neurological: Positive for headaches. Negative for dizziness, tremors, seizures, syncope, facial asymmetry, speech difficulty, weakness, light-headedness and numbness.   Psychiatric/Behavioral: Negative for agitation, behavioral problems, confusion, decreased concentration, dysphoric mood, hallucinations, self-injury, sleep disturbance and suicidal ideas. The patient is not nervous/anxious and is not hyperactive.              Vitals:    04/10/23 1534   BP: 128/72   Pulse: 74   SpO2: 98%       Neurologic Exam     Mental Status   Speech: speech is normal   Level of  consciousness: alert    Cranial Nerves     CN II   Visual fields full to confrontation.     CN III, IV, VI   Pupils are equal, round, and reactive to light.  Extraocular motions are normal.     CN V   Facial sensation intact.     CN VII   Facial expression full, symmetric.     CN VIII   Hearing: intact    CN IX, X   Palate: symmetric    CN XI   Right trapezius strength: normal  Left trapezius strength: normal    CN XII   Tongue: not atrophic  Fasciculations: absent  Tongue deviation: none    Motor Exam   Muscle bulk: normal    Strength   Right deltoid: 5/5  Left deltoid: 5/5  Right biceps: 5/5  Left biceps: 5/5  Right triceps: 5/5  Left triceps: 5/5  Right iliopsoas: 5/5  Left iliopsoas: 5/5  Right quadriceps: 5/5  Left quadriceps: 5/5  Right hamstrin/5  Left hamstrin/5  Right anterior tibial: 5/5  Left anterior tibial: 5/5  Right gastroc: 5/5  Left gastroc: 5/5Grip 5 out of 5 bilaterally     Sensory Exam   Right arm light touch: normal  Left arm light touch: normal  Right leg light touch: normal  Left leg light touch: normal    Gait, Coordination, and Reflexes     Coordination   Finger to nose coordination: normal  Heel to shin coordination: normal    Reflexes   Right brachioradialis: 2+  Left brachioradialis: 2+  Right biceps: 2+  Left biceps: 2+  Right patellar: 2+  Left patellar: 2+  Right achilles: 2+  Left achilles: 2+Toes neutral       Physical Exam  Eyes:      Extraocular Movements: EOM normal.      Pupils: Pupils are equal, round, and reactive to light.   Neurological:      Coordination: Finger-Nose-Finger Test and Heel to Shin Test normal.      Deep Tendon Reflexes:      Reflex Scores:       Bicep reflexes are 2+ on the right side and 2+ on the left side.       Brachioradialis reflexes are 2+ on the right side and 2+ on the left side.       Patellar reflexes are 2+ on the right side and 2+ on the left side.       Achilles reflexes are 2+ on the right side and 2+ on the left side.  Psychiatric:          Speech: Speech normal.         Procedures    Assessment/Plan:    1. Migraine headaches.  - Discussed headache hygiene, drinking 8 glasses of water a day, and exercising 20 minutes a day.  - Discussed nonpharmaceutical preventatives such as riboflavin 200 mg twice a day, and frovatriptan/rizatriptan. After discussion, will initiate rizatriptan 10mg PRN for migraines  - Discussed oral medications.  - Discussed Botox injections.  - She will follow up in 3 to 4 months.    -Patient has a chronic condition of migraine headaches. It is worsening overall she will try riboflavin as preventative as well as prescription Medications were managed during this visit.    Diagnoses and all orders for this visit:    1. Migraine without status migrainosus, not intractable, unspecified migraine type (Primary)    Other orders  -     Discontinue: rizatriptan (Maxalt) 10 MG tablet; Take 1 tablet by mouth 1 (One) Time As Needed for Migraine for up to 120 doses. May repeat in 2 hours if needed  Dispense: 9 tablet; Refill: 3          Shon Franco MD    Transcribed from ambient dictation for Shon Franco MD by Sarah Pritchard.  04/10/23   16:43 EDT    Patient or patient representative verbalized consent to the visit recording.

## 2023-04-10 NOTE — LETTER
April 10, 2023     KAIT Harris  [address] [facility] [address]    Patient: Aziza Sarkar   YOB: 1990   Date of Visit: 4/10/2023       Dear KAIT Yu:    Thank you for referring Aziza Sarkar to me for evaluation. Below are the relevant portions of my assessment and plan of care.    If you have questions, please do not hesitate to call me. I look forward to following Aziza along with you.         Sincerely,        Shon Franco MD        CC: MD Adriel Sánchez Rachel, MA  04/10/23 1536  Sign when Signing Visit  Chief Complaint   Patient presents with    Migraine       Patient ID: Aziza Sarkar is a 33 y.o. female.    HPI:    The following portions of the patient's history were reviewed and updated as appropriate: allergies, current medications, past family history, past medical history, past social history, past surgical history and problem list.    Review of Systems   Musculoskeletal: Negative for back pain, gait problem and neck pain.   Allergic/Immunologic: Positive for environmental allergies. Negative for food allergies and immunocompromised state.   Neurological: Positive for headaches. Negative for dizziness, tremors, seizures, syncope, facial asymmetry, speech difficulty, weakness, light-headedness and numbness.   Psychiatric/Behavioral: Negative for agitation, behavioral problems, confusion, decreased concentration, dysphoric mood, hallucinations, self-injury, sleep disturbance and suicidal ideas. The patient is not nervous/anxious and is not hyperactive.       ***    There were no vitals filed for this visit.    Neurologic Exam    Physical Exam    Procedures    Assessment/Plan:        There are no diagnoses linked to this encounter.      Ban Morel MA

## 2023-04-14 ENCOUNTER — TELEPHONE (OUTPATIENT)
Dept: NEUROLOGY | Facility: CLINIC | Age: 33
End: 2023-04-14
Payer: COMMERCIAL

## 2023-04-14 RX ORDER — RIZATRIPTAN BENZOATE 10 MG/1
10 TABLET ORAL ONCE AS NEEDED
Qty: 9 TABLET | Refills: 3 | Status: SHIPPED | OUTPATIENT
Start: 2023-04-14 | End: 2023-08-12

## 2023-04-20 ENCOUNTER — PATIENT ROUNDING (BHMG ONLY) (OUTPATIENT)
Dept: NEUROLOGY | Facility: CLINIC | Age: 33
End: 2023-04-20
Payer: COMMERCIAL

## 2023-09-18 RX ORDER — NORETHINDRONE ACETATE AND ETHINYL ESTRADIOL, ETHINYL ESTRADIOL AND FERROUS FUMARATE 1MG-10(24)
KIT ORAL
Qty: 28 TABLET | Refills: 11 | Status: SHIPPED | OUTPATIENT
Start: 2023-09-18

## 2023-12-20 ENCOUNTER — OFFICE VISIT (OUTPATIENT)
Dept: OBSTETRICS AND GYNECOLOGY | Age: 33
End: 2023-12-20
Payer: COMMERCIAL

## 2023-12-20 VITALS
BODY MASS INDEX: 34.16 KG/M2 | DIASTOLIC BLOOD PRESSURE: 70 MMHG | WEIGHT: 205 LBS | SYSTOLIC BLOOD PRESSURE: 122 MMHG | HEIGHT: 65 IN

## 2023-12-20 DIAGNOSIS — Z01.419 WELL WOMAN EXAM WITH ROUTINE GYNECOLOGICAL EXAM: Primary | ICD-10-CM

## 2023-12-20 DIAGNOSIS — Z30.41 ORAL CONTRACEPTIVE PILL SURVEILLANCE: ICD-10-CM

## 2023-12-20 DIAGNOSIS — Z12.4 SCREENING FOR CERVICAL CANCER: ICD-10-CM

## 2023-12-20 NOTE — PROGRESS NOTES
Subjective     Chief Complaint   Patient presents with    Gynecologic Exam     AE, last pap 06/15/2020 neg/hpv neg, mg 2020  Cc:  Issues with BC       History of Present Illness    Aziza Sarkar is a 33 y.o.  who presents for annual exam.    Doing well  Taking lo loestrin and having withdraw bleed every 3 months. This past time she only had spotting for a day or two and didn't know if this was normal. She has now been off the pill for 3 weeks.  Kids doing well. Twins are 8. 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  She has no other GYN concerns or complaints today  Obstetric History:  OB History          4    Para   3    Term   2       1    AB   1    Living   2         SAB   1    IAB        Ectopic        Molar        Multiple   1    Live Births   2               Menstrual History:     Patient's last menstrual period was 2023.         Current contraception: OCP (estrogen/progesterone)  History of abnormal Pap smear: no  Received Gardasil immunization: no  Perform regular self breast exam: yes - .  Family history of uterine or ovarian cancer: no  Family History of colon cancer: no  Family history of breast cancer: yes - mother , negative genetics    Mammogram: not indicated.  Colonoscopy: not indicated.  DEXA: not indicated.    Exercise: moderately active  Calcium/Vitamin D: adequate intake    The following portions of the patient's history were reviewed and updated as appropriate: allergies, current medications, past family history, past medical history, past social history, past surgical history, and problem list.    Review of Systems   Constitutional: Negative.    Respiratory: Negative.     Cardiovascular: Negative.    Gastrointestinal: Negative.    Genitourinary: Negative.    Skin: Negative.    Psychiatric/Behavioral: Negative.             Objective   Physical Exam  Constitutional:       General: She is awake.      Appearance: Normal appearance. She is well-developed. She  is obese.   HENT:      Head: Normocephalic and atraumatic.      Nose: Nose normal.   Neck:      Thyroid: No thyroid mass, thyromegaly or thyroid tenderness.   Cardiovascular:      Rate and Rhythm: Normal rate and regular rhythm.      Pulses: Normal pulses.      Heart sounds: Normal heart sounds.   Pulmonary:      Effort: Pulmonary effort is normal.      Breath sounds: Normal breath sounds.   Chest:   Breasts:     Breasts are symmetrical.      Right: Normal. No swelling, bleeding, inverted nipple, mass, nipple discharge, skin change or tenderness.      Left: Normal. No swelling, bleeding, inverted nipple, mass, nipple discharge, skin change or tenderness.   Abdominal:      General: Abdomen is flat. Bowel sounds are normal.      Palpations: Abdomen is soft.      Tenderness: There is no abdominal tenderness.   Genitourinary:     General: Normal vulva.      Labia:         Right: No rash, tenderness, lesion or injury.         Left: No rash, tenderness, lesion or injury.       Urethra: No prolapse, urethral pain, urethral swelling or urethral lesion.      Vagina: Normal. No signs of injury. No vaginal discharge, erythema, tenderness, bleeding, lesions or prolapsed vaginal walls.      Cervix: Normal. No discharge, friability, lesion, erythema or cervical bleeding.      Uterus: Normal. Not enlarged, not tender and no uterine prolapse.       Adnexa: Right adnexa normal and left adnexa normal.        Right: No mass, tenderness or fullness.          Left: No mass, tenderness or fullness.        Rectum: Normal. No mass.      Comments: Exam limited by body habitus   Musculoskeletal:      Cervical back: Normal range of motion and neck supple.   Lymphadenopathy:      Upper Body:      Right upper body: No supraclavicular adenopathy.      Left upper body: No supraclavicular adenopathy.   Skin:     General: Skin is warm and dry.   Neurological:      General: No focal deficit present.      Mental Status: She is alert and oriented to  "person, place, and time.   Psychiatric:         Mood and Affect: Mood normal.         Behavior: Behavior normal. Behavior is cooperative.         Thought Content: Thought content normal.         Judgment: Judgment normal.         /70   Ht 165.1 cm (65\")   Wt 93 kg (205 lb)   LMP 09/04/2023   BMI 34.11 kg/m²     Assessment & Plan   Diagnoses and all orders for this visit:    1. Well woman exam with routine gynecological exam (Primary)    2. Screening for cervical cancer  -     IGP, Apt HPV,rfx 16 / 18,45    3. Oral contraceptive pill surveillance        All questions answered.  Breast self exam technique reviewed and patient encouraged to perform self-exam monthly.  Discussed healthy lifestyle modifications.  Recommended 30 minutes of aerobic exercise five times per week.  Discussed calcium needs to prevent osteoporosis.    -Pap done  -Reassurance given that no menses with lo loestrin is not uncommon. Can take continously or do placebo every 3 months. May only have light spotting. She will restart. BUM x 1 month.  -F/u 1 year               "

## 2024-03-04 RX ORDER — RIZATRIPTAN BENZOATE 10 MG/1
10 TABLET ORAL
Qty: 9 TABLET | Refills: 3 | Status: SHIPPED | OUTPATIENT
Start: 2024-03-04

## 2024-08-20 RX ORDER — NORETHINDRONE ACETATE AND ETHINYL ESTRADIOL, ETHINYL ESTRADIOL AND FERROUS FUMARATE 1MG-10(24)
1 KIT ORAL DAILY
Qty: 28 TABLET | Refills: 11 | Status: SHIPPED | OUTPATIENT
Start: 2024-08-20

## 2024-10-14 ENCOUNTER — TELEMEDICINE (OUTPATIENT)
Dept: FAMILY MEDICINE CLINIC | Facility: TELEHEALTH | Age: 34
End: 2024-10-14
Payer: COMMERCIAL

## 2024-10-14 VITALS — HEART RATE: 95 BPM | TEMPERATURE: 99.4 F

## 2024-10-14 DIAGNOSIS — H60.392 OTHER INFECTIVE ACUTE OTITIS EXTERNA OF LEFT EAR: Primary | ICD-10-CM

## 2024-10-14 PROCEDURE — 99213 OFFICE O/P EST LOW 20 MIN: CPT | Performed by: NURSE PRACTITIONER

## 2024-10-14 RX ORDER — AZITHROMYCIN 250 MG/1
TABLET, FILM COATED ORAL
Qty: 6 TABLET | Refills: 0 | Status: SHIPPED | OUTPATIENT
Start: 2024-10-14

## 2024-10-14 RX ORDER — CIPROFLOXACIN AND DEXAMETHASONE 3; 1 MG/ML; MG/ML
4 SUSPENSION/ DROPS AURICULAR (OTIC) 2 TIMES DAILY
Qty: 7.5 ML | Refills: 0 | Status: SHIPPED | OUTPATIENT
Start: 2024-10-14 | End: 2024-10-21

## 2024-10-14 NOTE — PROGRESS NOTES
Subjective   Aziza Sarkar is a 34 y.o. female.     History of Present Illness  She has left ear pain x 1 week, her hearing is muffled and it is painful when she blows her nose, she has seen crusty drainage when she wakes up in the morning. She does have allergies and takes ceterizine and a medicated nasal spray. She had similar symptoms earlier this year and had an inner and outer ear infection.       The following portions of the patient's history were reviewed and updated as appropriate: allergies, current medications, past family history, past medical history, past social history, past surgical history, and problem list.    Review of Systems   Constitutional:  Negative for fever.   HENT:  Positive for congestion, ear pain and rhinorrhea.        Objective   Physical Exam  Constitutional:       General: She is not in acute distress.     Appearance: She is well-developed. She is not diaphoretic.   HENT:      Right Ear: Tympanic membrane is scarred.      Left Ear: Tenderness present.      Ears:      Comments:  Erythematous external canal with a large amount of cerumen partially blocking the TM; left ear is warm to touch     Mouth/Throat:      Pharynx: Posterior oropharyngeal erythema present.   Pulmonary:      Effort: Pulmonary effort is normal.   Lymphadenopathy:      Head:      Left side of head: Tonsillar and posterior auricular adenopathy present.   Neurological:      Mental Status: She is alert and oriented to person, place, and time.   Psychiatric:         Behavior: Behavior normal.           Assessment & Plan   Diagnoses and all orders for this visit:    1. Other infective acute otitis externa of left ear (Primary)  -     azithromycin (Zithromax Z-Davy) 250 MG tablet; Take 2 tablets by mouth on day 1, then 1 tablet daily on days 2-5  Dispense: 6 tablet; Refill: 0  -     ciprofloxacin-dexAMETHasone (Ciprodex) 0.3-0.1 % otic suspension; Administer 4 drops into the left ear 2 (Two) Times a Day for 7 days.   Dispense: 7.5 mL; Refill: 0        She states amoxicillin does not work for her to treat ear infections         The use of a video visit has been reviewed with the patient and verbal informed consent has been obtained. Myself and Aziza Sarkar participated in this visit. The patient is located in  [place], [place] at [facility] . I am located in [place], [place]. [employer] and [email or web address] were utilized. I spent 32 minutes in the patient's chart for this visit.

## 2024-10-14 NOTE — PATIENT INSTRUCTIONS
-Tylenol/motrin/heating pad for pain  -If symptoms worsen or do not improve in 2-3 days follow up in person

## 2024-12-31 ENCOUNTER — OFFICE VISIT (OUTPATIENT)
Dept: OBSTETRICS AND GYNECOLOGY | Age: 34
End: 2024-12-31
Payer: COMMERCIAL

## 2024-12-31 VITALS
HEIGHT: 66 IN | BODY MASS INDEX: 32.62 KG/M2 | WEIGHT: 203 LBS | SYSTOLIC BLOOD PRESSURE: 124 MMHG | DIASTOLIC BLOOD PRESSURE: 72 MMHG

## 2024-12-31 DIAGNOSIS — Z12.4 SCREENING FOR CERVICAL CANCER: ICD-10-CM

## 2024-12-31 DIAGNOSIS — Z01.419 WELL WOMAN EXAM WITH ROUTINE GYNECOLOGICAL EXAM: Primary | ICD-10-CM

## 2024-12-31 DIAGNOSIS — Z30.41 ORAL CONTRACEPTIVE PILL SURVEILLANCE: ICD-10-CM

## 2024-12-31 DIAGNOSIS — Z80.3 FAMILY HISTORY OF BREAST CANCER IN MOTHER: ICD-10-CM

## 2024-12-31 DIAGNOSIS — Z12.31 ENCOUNTER FOR SCREENING MAMMOGRAM FOR MALIGNANT NEOPLASM OF BREAST: ICD-10-CM

## 2024-12-31 PROCEDURE — 99395 PREV VISIT EST AGE 18-39: CPT | Performed by: NURSE PRACTITIONER

## 2024-12-31 RX ORDER — RIZATRIPTAN BENZOATE 10 MG/1
10 TABLET ORAL ONCE AS NEEDED
Qty: 9 TABLET | Refills: 3 | Status: SHIPPED | OUTPATIENT
Start: 2024-12-31

## 2024-12-31 RX ORDER — NORETHINDRONE ACETATE AND ETHINYL ESTRADIOL, ETHINYL ESTRADIOL AND FERROUS FUMARATE 1MG-10(24)
1 KIT ORAL DAILY
Qty: 28 TABLET | Refills: 11 | Status: SHIPPED | OUTPATIENT
Start: 2024-12-31

## 2024-12-31 NOTE — PROGRESS NOTES
Subjective     Chief Complaint   Patient presents with    Gynecologic Exam     Annual, last pap 2023 neg/hpv neg  CC:  no problems       History of Present Illness    Aziza Sarkar is a 34 y.o.  who presents for annual exam.    Doing well  On OCP's for contraception - takes continuously, no menses  Girls are 9 and doing well  She has no GYN concerns or complaints  Needing OCP's and migraine meds refilled    Obstetric History:  OB History          4    Para   3    Term   2       1    AB   1    Living   2         SAB   1    IAB        Ectopic        Molar        Multiple   1    Live Births   2               Menstrual History:     No LMP recorded. (Menstrual status: Oral contraceptives).         Current contraception: OCP (estrogen/progesterone)  History of abnormal Pap smear: no  Received Gardasil immunization: no  Perform regular self breast exam: yes - .  Family history of uterine or ovarian cancer: no  Family History of colon cancer: no  Family history of breast cancer: yes - mother, negative genetics    Mammogram: not indicated.  Colonoscopy: not indicated.  DEXA: not indicated.    Exercise: moderately active  Calcium/Vitamin D: adequate intake    The following portions of the patient's history were reviewed and updated as appropriate: allergies, current medications, past family history, past medical history, past social history, past surgical history, and problem list.    Review of Systems   Constitutional: Negative.    Respiratory: Negative.     Cardiovascular: Negative.    Gastrointestinal: Negative.    Genitourinary: Negative.    Skin: Negative.    Psychiatric/Behavioral: Negative.             Objective   Physical Exam  Constitutional:       General: She is awake.      Appearance: Normal appearance. She is well-developed. She is obese.   HENT:      Head: Normocephalic and atraumatic.      Nose: Nose normal.   Neck:      Thyroid: No thyroid mass, thyromegaly or thyroid tenderness.    Cardiovascular:      Rate and Rhythm: Normal rate and regular rhythm.      Pulses: Normal pulses.      Heart sounds: Normal heart sounds.   Pulmonary:      Effort: Pulmonary effort is normal.      Breath sounds: Normal breath sounds.   Chest:   Breasts:     Breasts are symmetrical.      Right: Normal. No swelling, bleeding, inverted nipple, mass, nipple discharge, skin change or tenderness.      Left: Normal. No swelling, bleeding, inverted nipple, mass, nipple discharge, skin change or tenderness.   Abdominal:      General: Abdomen is flat. Bowel sounds are normal.      Palpations: Abdomen is soft.      Tenderness: There is no abdominal tenderness.   Genitourinary:     General: Normal vulva.      Labia:         Right: No rash, tenderness, lesion or injury.         Left: No rash, tenderness, lesion or injury.       Urethra: No prolapse, urethral pain, urethral swelling or urethral lesion.      Vagina: Normal. No signs of injury. No vaginal discharge, erythema, tenderness, bleeding, lesions or prolapsed vaginal walls.      Cervix: Normal. No discharge, friability, lesion, erythema or cervical bleeding.      Uterus: Normal. Not enlarged, not tender and no uterine prolapse.       Adnexa: Right adnexa normal and left adnexa normal.        Right: No mass, tenderness or fullness.          Left: No mass, tenderness or fullness.        Rectum: Normal. No mass.   Musculoskeletal:      Cervical back: Normal range of motion and neck supple.   Lymphadenopathy:      Upper Body:      Right upper body: No supraclavicular adenopathy.      Left upper body: No supraclavicular adenopathy.   Skin:     General: Skin is warm and dry.   Neurological:      General: No focal deficit present.      Mental Status: She is alert and oriented to person, place, and time.   Psychiatric:         Mood and Affect: Mood normal.         Behavior: Behavior normal. Behavior is cooperative.         Thought Content: Thought content normal.          "Judgment: Judgment normal.         /72   Ht 167.6 cm (66\")   Wt 92.1 kg (203 lb)   BMI 32.77 kg/m²     Assessment & Plan   Diagnoses and all orders for this visit:    1. Well woman exam with routine gynecological exam (Primary)    2. Screening for cervical cancer  -     IGP, Apt HPV,rfx 16 / 18,45    3. Oral contraceptive pill surveillance    4. Family history of breast cancer in mother  -     Mammo Screening Digital Tomosynthesis Bilateral With CAD; Future    5. Encounter for screening mammogram for malignant neoplasm of breast  -     Mammo Screening Digital Tomosynthesis Bilateral With CAD; Future    Other orders  -     Norethin-Eth Estrad-Fe Biphas (Lo Loestrin Fe) 1 MG-10 MCG / 10 MCG tablet; Take 1 tablet by mouth Daily.  Dispense: 28 tablet; Refill: 11  -     rizatriptan (MAXALT) 10 MG tablet; Take 1 tablet by mouth 1 (One) Time As Needed for Migraine for up to 1 dose. AT ONSET OF HEADACHE MAY REPEAT ONE TABLET IN 2 HOURS IF NEEDED  Dispense: 9 tablet; Refill: 3        All questions answered.  Breast self exam technique reviewed and patient encouraged to perform self-exam monthly.  Discussed healthy lifestyle modifications.  Recommended 30 minutes of aerobic exercise five times per week.  Discussed calcium needs to prevent osteoporosis.    -F/u 1 year               "

## 2025-01-07 LAB
CYTOLOGIST CVX/VAG CYTO: NORMAL
CYTOLOGY CVX/VAG DOC CYTO: NORMAL
CYTOLOGY CVX/VAG DOC THIN PREP: NORMAL
DX ICD CODE: NORMAL
HPV I/H RISK 4 DNA CVX QL PROBE+SIG AMP: NEGATIVE
Lab: NORMAL
OTHER STN SPEC: NORMAL
STAT OF ADQ CVX/VAG CYTO-IMP: NORMAL

## 2025-07-30 ENCOUNTER — HOSPITAL ENCOUNTER (OUTPATIENT)
Facility: HOSPITAL | Age: 35
Discharge: HOME | End: 2025-07-30
Admitting: NURSE PRACTITIONER
Payer: COMMERCIAL

## 2025-07-30 DIAGNOSIS — Z80.3 FAMILY HISTORY OF BREAST CANCER IN MOTHER: ICD-10-CM

## 2025-07-30 DIAGNOSIS — Z12.31 ENCOUNTER FOR SCREENING MAMMOGRAM FOR MALIGNANT NEOPLASM OF BREAST: ICD-10-CM

## 2025-07-30 PROCEDURE — 77067 SCR MAMMO BI INCL CAD: CPT

## 2025-07-30 PROCEDURE — 77063 BREAST TOMOSYNTHESIS BI: CPT

## 2025-08-11 DIAGNOSIS — N64.89 BREAST ASYMMETRY: Primary | ICD-10-CM

## 2025-08-11 DIAGNOSIS — N63.20 MASS OF LEFT BREAST, UNSPECIFIED QUADRANT: ICD-10-CM

## 2025-08-18 ENCOUNTER — TELEPHONE (OUTPATIENT)
Dept: OBSTETRICS AND GYNECOLOGY | Age: 35
End: 2025-08-18
Payer: COMMERCIAL

## 2025-08-19 ENCOUNTER — TELEPHONE (OUTPATIENT)
Dept: OBSTETRICS AND GYNECOLOGY | Age: 35
End: 2025-08-19
Payer: COMMERCIAL

## 2025-08-28 ENCOUNTER — TELEPHONE (OUTPATIENT)
Dept: OBSTETRICS AND GYNECOLOGY | Age: 35
End: 2025-08-28
Payer: COMMERCIAL

## 2025-08-28 DIAGNOSIS — N63.20 MASS OF LEFT BREAST, UNSPECIFIED QUADRANT: ICD-10-CM

## 2025-08-28 DIAGNOSIS — N64.89 BREAST ASYMMETRY: ICD-10-CM

## 2025-08-28 DIAGNOSIS — N64.89 BREAST ASYMMETRY: Primary | ICD-10-CM

## 2025-08-28 DIAGNOSIS — N60.02 BREAST CYST, LEFT: ICD-10-CM

## (undated) DEVICE — CANN VAC GYN VACURETTE BERKELEY CRV 10MM 1P/U STRL

## (undated) DEVICE — SYS FLUID MGMT HYST SAFETOUCH

## (undated) DEVICE — CANN VAC GYN VACURETTE BERKELEY CRV 8MM 1P/U STRL

## (undated) DEVICE — TBG UTER ASP

## (undated) DEVICE — HOSE BT TO BT VAC CURETTAGE SNGL PT USE

## (undated) DEVICE — TBG W FLTR FOR BERKELEY SYSTEM

## (undated) DEVICE — GOWN,PREVENTION PLUS,XXLARGE,STERILE: Brand: MEDLINE

## (undated) DEVICE — GLV SURG SENSICARE W/ALOE PF LF 7.5 STRL

## (undated) DEVICE — LAG PERI GYN: Brand: MEDLINE INDUSTRIES, INC.

## (undated) DEVICE — STRAP STIRUP SLP RNG 19X3.5IN DISP